# Patient Record
Sex: FEMALE | Race: WHITE | NOT HISPANIC OR LATINO | Employment: PART TIME | URBAN - METROPOLITAN AREA
[De-identification: names, ages, dates, MRNs, and addresses within clinical notes are randomized per-mention and may not be internally consistent; named-entity substitution may affect disease eponyms.]

---

## 2023-02-23 LAB
ALANINE AMINOTRANSFERASE (SGPT) (U/L) IN SER/PLAS: 37 U/L (ref 7–45)
ALBUMIN (G/DL) IN SER/PLAS: 4.4 G/DL (ref 3.4–5)
ALKALINE PHOSPHATASE (U/L) IN SER/PLAS: 72 U/L (ref 33–136)
ANION GAP IN SER/PLAS: 10 MMOL/L (ref 10–20)
ASPARTATE AMINOTRANSFERASE (SGOT) (U/L) IN SER/PLAS: 27 U/L (ref 9–39)
BASOPHILS (10*3/UL) IN BLOOD BY AUTOMATED COUNT: 0.06 X10E9/L (ref 0–0.1)
BASOPHILS/100 LEUKOCYTES IN BLOOD BY AUTOMATED COUNT: 1.2 % (ref 0–2)
BILIRUBIN TOTAL (MG/DL) IN SER/PLAS: 0.7 MG/DL (ref 0–1.2)
CALCIDIOL (25 OH VITAMIN D3) (NG/ML) IN SER/PLAS: 23 NG/ML
CALCIUM (MG/DL) IN SER/PLAS: 9.2 MG/DL (ref 8.6–10.6)
CARBON DIOXIDE, TOTAL (MMOL/L) IN SER/PLAS: 27 MMOL/L (ref 21–32)
CHLORIDE (MMOL/L) IN SER/PLAS: 108 MMOL/L (ref 98–107)
CHOLESTEROL (MG/DL) IN SER/PLAS: 171 MG/DL (ref 0–199)
CHOLESTEROL IN HDL (MG/DL) IN SER/PLAS: 80.7 MG/DL
CHOLESTEROL/HDL RATIO: 2.1
CREATININE (MG/DL) IN SER/PLAS: 0.9 MG/DL (ref 0.5–1.05)
EOSINOPHILS (10*3/UL) IN BLOOD BY AUTOMATED COUNT: 0.24 X10E9/L (ref 0–0.7)
EOSINOPHILS/100 LEUKOCYTES IN BLOOD BY AUTOMATED COUNT: 4.9 % (ref 0–6)
ERYTHROCYTE DISTRIBUTION WIDTH (RATIO) BY AUTOMATED COUNT: 13.1 % (ref 11.5–14.5)
ERYTHROCYTE MEAN CORPUSCULAR HEMOGLOBIN CONCENTRATION (G/DL) BY AUTOMATED: 32.1 G/DL (ref 32–36)
ERYTHROCYTE MEAN CORPUSCULAR VOLUME (FL) BY AUTOMATED COUNT: 96 FL (ref 80–100)
ERYTHROCYTES (10*6/UL) IN BLOOD BY AUTOMATED COUNT: 4.35 X10E12/L (ref 4–5.2)
ESTIMATED AVERAGE GLUCOSE FOR HBA1C: 114 MG/DL
GFR FEMALE: 70 ML/MIN/1.73M2
GLUCOSE (MG/DL) IN SER/PLAS: 90 MG/DL (ref 74–99)
HEMATOCRIT (%) IN BLOOD BY AUTOMATED COUNT: 41.7 % (ref 36–46)
HEMOGLOBIN (G/DL) IN BLOOD: 13.4 G/DL (ref 12–16)
HEMOGLOBIN A1C/HEMOGLOBIN TOTAL IN BLOOD: 5.6 %
IMMATURE GRANULOCYTES/100 LEUKOCYTES IN BLOOD BY AUTOMATED COUNT: 0.2 % (ref 0–0.9)
LDL: 60 MG/DL (ref 0–99)
LEUKOCYTES (10*3/UL) IN BLOOD BY AUTOMATED COUNT: 4.9 X10E9/L (ref 4.4–11.3)
LYMPHOCYTES (10*3/UL) IN BLOOD BY AUTOMATED COUNT: 1.99 X10E9/L (ref 1.2–4.8)
LYMPHOCYTES/100 LEUKOCYTES IN BLOOD BY AUTOMATED COUNT: 40.9 % (ref 13–44)
MONOCYTES (10*3/UL) IN BLOOD BY AUTOMATED COUNT: 0.33 X10E9/L (ref 0.1–1)
MONOCYTES/100 LEUKOCYTES IN BLOOD BY AUTOMATED COUNT: 6.8 % (ref 2–10)
NEUTROPHILS (10*3/UL) IN BLOOD BY AUTOMATED COUNT: 2.24 X10E9/L (ref 1.2–7.7)
NEUTROPHILS/100 LEUKOCYTES IN BLOOD BY AUTOMATED COUNT: 46 % (ref 40–80)
NRBC (PER 100 WBCS) BY AUTOMATED COUNT: 0 /100 WBC (ref 0–0)
PLATELETS (10*3/UL) IN BLOOD AUTOMATED COUNT: 228 X10E9/L (ref 150–450)
POTASSIUM (MMOL/L) IN SER/PLAS: 4.4 MMOL/L (ref 3.5–5.3)
PROTEIN TOTAL: 6.6 G/DL (ref 6.4–8.2)
SODIUM (MMOL/L) IN SER/PLAS: 141 MMOL/L (ref 136–145)
THYROTROPIN (MIU/L) IN SER/PLAS BY DETECTION LIMIT <= 0.05 MIU/L: 1.74 MIU/L (ref 0.44–3.98)
TRIGLYCERIDE (MG/DL) IN SER/PLAS: 151 MG/DL (ref 0–149)
UREA NITROGEN (MG/DL) IN SER/PLAS: 20 MG/DL (ref 6–23)
VLDL: 30 MG/DL (ref 0–40)

## 2023-03-14 LAB
ALANINE AMINOTRANSFERASE (SGPT) (U/L) IN SER/PLAS: 31 U/L (ref 7–45)
ASPARTATE AMINOTRANSFERASE (SGOT) (U/L) IN SER/PLAS: 25 U/L (ref 9–39)
CHOLESTEROL (MG/DL) IN SER/PLAS: 174 MG/DL (ref 0–199)
CHOLESTEROL IN HDL (MG/DL) IN SER/PLAS: 82.8 MG/DL
CHOLESTEROL/HDL RATIO: 2.1
LDL: 62 MG/DL (ref 0–99)
TRIGLYCERIDE (MG/DL) IN SER/PLAS: 145 MG/DL (ref 0–149)
VLDL: 29 MG/DL (ref 0–40)

## 2023-05-22 ENCOUNTER — DOCUMENTATION (OUTPATIENT)
Dept: PRIMARY CARE | Facility: CLINIC | Age: 68
End: 2023-05-22
Payer: MEDICARE

## 2023-05-23 ENCOUNTER — PATIENT OUTREACH (OUTPATIENT)
Dept: PRIMARY CARE | Facility: CLINIC | Age: 68
End: 2023-05-23
Payer: MEDICARE

## 2023-05-23 DIAGNOSIS — G45.4 TRANSIENT GLOBAL AMNESIA: ICD-10-CM

## 2023-05-23 NOTE — PROGRESS NOTES
Discharge Facility:[facility]  Discharge Diagnosis:Transient global Amnesia  Admission Date:5/19/23  Discharge Date: 5/20/23    PCP Appointment Date:6/5/23  Specialist Appointment Date:   Hospital Encounter and Summary: Linked   See discharge assessment below for further details  Engagement  Call Start Time: 0956 (5/23/2023 10:05 AM)    Medications  Medications reviewed with patient/caregiver?: Yes (5/23/2023 10:05 AM)  Is the patient having any side effects they believe may be caused by any medication additions or changes?: No (5/23/2023 10:05 AM)  Does the patient have all medications ordered at discharge?: Yes (5/23/2023 10:05 AM)  Care Management Interventions: Provided patient education (5/23/2023 10:05 AM)  Prescription Comments: see med list (5/23/2023 10:05 AM)  Is the patient taking all medications as directed (includes completed medication regime)?: Yes (5/23/2023 10:05 AM)  Care Management Interventions: Provided patient education (5/23/2023 10:05 AM)  Medication Comments: see med list (5/23/2023 10:05 AM)    Appointments  Does the patient have a primary care provider?: Yes (5/23/2023 10:05 AM)  Care Management Interventions: Verified appointment date/time/provider; Educated patient on importance of making appointment; Advised patient to make appointment (5/23/2023 10:05 AM)  Has the patient kept scheduled appointments due by today?: Yes (5/23/2023 10:05 AM)  Care Management Interventions: Advised patient to keep appointment; Educated on importance of keeping appointment (5/23/2023 10:05 AM)    Patient Teaching  Does the patient have access to their discharge instructions?: Yes (5/23/2023 10:05 AM)  Care Management Interventions: Reviewed instructions with patient (5/23/2023 10:05 AM)  What is the patient's perception of their health status since discharge?: Improving (Patient reported feeling a whole lot better, she stated she planted her garden in the backyard.) (5/23/2023 10:05 AM)  Is the patient/caregiver  able to teach back the hierarchy of who to call/visit for symptoms/problems? PCP, Specialist, Home Health nurse, Urgent Care, ED, 911: Yes (5/23/2023 10:05 AM)

## 2023-06-05 ENCOUNTER — APPOINTMENT (OUTPATIENT)
Dept: PRIMARY CARE | Facility: CLINIC | Age: 68
End: 2023-06-05
Payer: MEDICARE

## 2023-06-22 NOTE — PROGRESS NOTES
Unable to reach patient for call back after patient's follow up appointment with PCP.  LVM  with call back number for patient to call if needed  Patient never followed up with PCP

## 2023-07-12 ENCOUNTER — OFFICE VISIT (OUTPATIENT)
Dept: PRIMARY CARE | Facility: CLINIC | Age: 68
End: 2023-07-12
Payer: MEDICARE

## 2023-07-12 VITALS
BODY MASS INDEX: 27.12 KG/M2 | DIASTOLIC BLOOD PRESSURE: 81 MMHG | SYSTOLIC BLOOD PRESSURE: 123 MMHG | HEART RATE: 66 BPM | OXYGEN SATURATION: 95 % | WEIGHT: 168 LBS

## 2023-07-12 DIAGNOSIS — I10 BENIGN ESSENTIAL HYPERTENSION: ICD-10-CM

## 2023-07-12 DIAGNOSIS — H53.8 BLURRY VISION: ICD-10-CM

## 2023-07-12 DIAGNOSIS — M47.812 CERVICAL SPONDYLOSIS WITHOUT MYELOPATHY: Primary | ICD-10-CM

## 2023-07-12 DIAGNOSIS — I25.10 CORONARY ARTERY DISEASE INVOLVING NATIVE HEART, UNSPECIFIED VESSEL OR LESION TYPE, UNSPECIFIED WHETHER ANGINA PRESENT: ICD-10-CM

## 2023-07-12 DIAGNOSIS — K21.9 GASTROESOPHAGEAL REFLUX DISEASE, UNSPECIFIED WHETHER ESOPHAGITIS PRESENT: ICD-10-CM

## 2023-07-12 PROBLEM — M54.2 NECK PAIN: Status: ACTIVE | Noted: 2023-07-12

## 2023-07-12 PROBLEM — M48.02 NEUROFORAMINAL STENOSIS OF CERVICAL SPINE: Status: ACTIVE | Noted: 2023-07-12

## 2023-07-12 PROBLEM — G45.4 AMNESIA, GLOBAL, TRANSIENT: Status: ACTIVE | Noted: 2023-07-12

## 2023-07-12 PROBLEM — R79.9 ABNORMAL BLOOD CHEMISTRY: Status: ACTIVE | Noted: 2023-07-12

## 2023-07-12 PROBLEM — M54.50 LOW BACK PAIN: Status: ACTIVE | Noted: 2023-07-12

## 2023-07-12 PROBLEM — M47.816 LUMBAR SPONDYLOSIS: Status: ACTIVE | Noted: 2023-07-12

## 2023-07-12 PROBLEM — R41.3 AMNESIA: Status: ACTIVE | Noted: 2023-07-12

## 2023-07-12 PROBLEM — K57.90 DIVERTICULOSIS: Status: ACTIVE | Noted: 2023-07-12

## 2023-07-12 PROBLEM — F41.9 ANXIETY: Status: ACTIVE | Noted: 2023-07-12

## 2023-07-12 PROBLEM — M47.819 FACET JOINT DISEASE: Status: ACTIVE | Noted: 2023-07-12

## 2023-07-12 PROBLEM — Z20.822 CLOSE EXPOSURE TO COVID-19 VIRUS: Status: ACTIVE | Noted: 2023-07-12

## 2023-07-12 PROBLEM — J30.9 ALLERGIC RHINITIS: Status: ACTIVE | Noted: 2023-07-12

## 2023-07-12 PROBLEM — M25.50 JOINT PAIN: Status: ACTIVE | Noted: 2023-07-12

## 2023-07-12 PROBLEM — R00.2 PALPITATION: Status: ACTIVE | Noted: 2023-07-12

## 2023-07-12 PROBLEM — L98.9 SKIN LESION: Status: ACTIVE | Noted: 2023-07-12

## 2023-07-12 PROBLEM — M19.049 HAND ARTHRITIS: Status: ACTIVE | Noted: 2023-07-12

## 2023-07-12 PROBLEM — E55.9 VITAMIN D DEFICIENCY: Status: ACTIVE | Noted: 2023-07-12

## 2023-07-12 PROBLEM — E78.5 HYPERLIPIDEMIA: Status: ACTIVE | Noted: 2023-07-12

## 2023-07-12 PROBLEM — M79.18 MYOFASCIAL PAIN: Status: ACTIVE | Noted: 2023-07-12

## 2023-07-12 PROBLEM — M48.061 SPINAL STENOSIS AT L4-L5 LEVEL: Status: ACTIVE | Noted: 2023-07-12

## 2023-07-12 PROBLEM — R93.89 ABNORMAL CAT SCAN: Status: ACTIVE | Noted: 2023-07-12

## 2023-07-12 PROBLEM — M48.062 SPINAL STENOSIS OF LUMBAR REGION WITH NEUROGENIC CLAUDICATION: Status: ACTIVE | Noted: 2023-07-12

## 2023-07-12 PROBLEM — R73.03 PREDIABETES: Status: ACTIVE | Noted: 2023-07-12

## 2023-07-12 PROCEDURE — 1159F MED LIST DOCD IN RCRD: CPT | Performed by: PHYSICIAN ASSISTANT

## 2023-07-12 PROCEDURE — 3074F SYST BP LT 130 MM HG: CPT | Performed by: PHYSICIAN ASSISTANT

## 2023-07-12 PROCEDURE — 3079F DIAST BP 80-89 MM HG: CPT | Performed by: PHYSICIAN ASSISTANT

## 2023-07-12 PROCEDURE — 1160F RVW MEDS BY RX/DR IN RCRD: CPT | Performed by: PHYSICIAN ASSISTANT

## 2023-07-12 PROCEDURE — 99214 OFFICE O/P EST MOD 30 MIN: CPT | Performed by: PHYSICIAN ASSISTANT

## 2023-07-12 PROCEDURE — 1036F TOBACCO NON-USER: CPT | Performed by: PHYSICIAN ASSISTANT

## 2023-07-12 PROCEDURE — 1126F AMNT PAIN NOTED NONE PRSNT: CPT | Performed by: PHYSICIAN ASSISTANT

## 2023-07-12 RX ORDER — METOPROLOL TARTRATE 50 MG/1
1 TABLET ORAL 2 TIMES DAILY
COMMUNITY
Start: 2017-11-20

## 2023-07-12 RX ORDER — EVOLOCUMAB 140 MG/ML
INJECTION, SOLUTION SUBCUTANEOUS
COMMUNITY
Start: 2017-02-07 | End: 2024-02-05 | Stop reason: SDUPTHER

## 2023-07-12 RX ORDER — FLAXSEED OIL 1000 MG
1 CAPSULE ORAL DAILY
Qty: 30 CAPSULE | Refills: 5 | Status: SHIPPED | OUTPATIENT
Start: 2023-07-12

## 2023-07-12 RX ORDER — FAMOTIDINE 20 MG/1
20 TABLET, FILM COATED ORAL DAILY
Qty: 30 TABLET | Refills: 5 | Status: SHIPPED | OUTPATIENT
Start: 2023-07-12 | End: 2024-02-13 | Stop reason: SDUPTHER

## 2023-07-12 RX ORDER — ERGOCALCIFEROL 1.25 MG/1
1 CAPSULE ORAL WEEKLY
COMMUNITY
Start: 2023-02-28 | End: 2023-08-28 | Stop reason: SDUPTHER

## 2023-07-12 RX ORDER — ATORVASTATIN CALCIUM 20 MG/1
20 TABLET, FILM COATED ORAL
COMMUNITY

## 2023-07-12 RX ORDER — TIZANIDINE 2 MG/1
2 TABLET ORAL NIGHTLY PRN
Qty: 30 TABLET | Refills: 0 | Status: CANCELLED | OUTPATIENT
Start: 2023-07-12 | End: 2023-08-11

## 2023-07-12 RX ORDER — FAMOTIDINE 10 MG/1
10 TABLET ORAL 2 TIMES DAILY
COMMUNITY
End: 2023-07-12 | Stop reason: ALTCHOICE

## 2023-07-12 RX ORDER — CYCLOBENZAPRINE HCL 5 MG
5 TABLET ORAL NIGHTLY PRN
Qty: 30 TABLET | Refills: 0 | Status: SHIPPED | OUTPATIENT
Start: 2023-07-12 | End: 2023-08-11

## 2023-07-12 RX ORDER — ASPIRIN 81 MG/1
1 TABLET ORAL DAILY
COMMUNITY
Start: 2015-11-23

## 2023-07-12 RX ORDER — CLOPIDOGREL BISULFATE 75 MG/1
75 TABLET ORAL
COMMUNITY

## 2023-07-12 NOTE — PROGRESS NOTES
Subjective   Isabella Dee is a 68 y.o. female who presents for follow up, lost brain.  HPI Isabella Dee is a 68 y.o. female presenting for a follow up. Generally doing well. Currently c/o:    Hospitalization: had an episode of transient global amnesia. No cause was ever determined. She cannot recall her entire hospitalization.     Cervical spondylosis: chronic, manages the pain with tylenol. She is on Plavix and cannot take NSAIDs. States some nights it is hard to fall asleep d/t the pain and that her neck and shoulder muscles feel tight.     HTN, HLD, CAD: stable on current regimen. Denies HA, dizziness, CP, SOB/ALTMAN, LE edema.     PMH: allergic rhinitis, HLD, CAD, HTN, esophageal reflux, diverticulosis, lumbar spondylosis and spinal stenosis, prediabetes, postmenopausal state     Health maintenance:  Immunizations:  -Flu: UTD   -Shingrix: UTD   -Pneumococcal: UTD  Fortino w/ sherri UTD (last 12/5/22)   Colon CA screening UTD (last 2/24/23)  DEXA screening UTD (last 6/2/8/21)     Last MCR/CPE: unknown (MCR ADV)    12 point ROS reviewed and negative other than as stated in HPI    /81   Pulse 66   Wt 76.2 kg (168 lb)   SpO2 95%   BMI 27.12 kg/m²   Objective   Physical Exam  Vitals reviewed.   Constitutional:       General: She is not in acute distress.     Appearance: Normal appearance. She is not ill-appearing.   HENT:      Head: Normocephalic and atraumatic.   Eyes:      General: No scleral icterus.     Extraocular Movements: Extraocular movements intact.      Conjunctiva/sclera: Conjunctivae normal.      Pupils: Pupils are equal, round, and reactive to light.   Cardiovascular:      Rate and Rhythm: Normal rate and regular rhythm.      Heart sounds: Normal heart sounds. No murmur heard.     No friction rub. No gallop.   Pulmonary:      Effort: Pulmonary effort is normal. No respiratory distress.      Breath sounds: Normal breath sounds. No stridor. No wheezing, rhonchi or rales.   Musculoskeletal:      Cervical  back: Normal range of motion.      Right lower leg: No edema.      Left lower leg: No edema.   Skin:     General: Skin is warm and dry.   Neurological:      Mental Status: She is alert and oriented to person, place, and time. Mental status is at baseline.      Cranial Nerves: No cranial nerve deficit.      Gait: Gait normal.   Psychiatric:         Mood and Affect: Mood normal.         Behavior: Behavior normal.         Assessment/Plan   Problem List Items Addressed This Visit       Benign essential hypertension     Well controlled. Continue metoprolol tartrate 50mg. Follow a strict DASH diet and exercise as tolerated         CAD (coronary artery disease)     Continue plavix, repatha, 81mg ASA, atorvastatin 20mg. Follow a mediterranean style diet and exercise as tolerated.          Relevant Medications    clopidogrel (Plavix) 75 mg tablet    metoprolol tartrate (Lopressor) 50 mg tablet    Cervical spondylosis without myelopathy - Primary     Trial cyclobenzaprine at bedtime only. Discussed strict precautions with use. Use topical lidocaine patches in 12 hour intervals. Can refer to PT and/or pain management if persistent         Relevant Medications    cyclobenzaprine (Flexeril) 5 mg tablet    Other Relevant Orders    Referral to [facility]    Gastroesophageal reflux disease     Prescription for famotidine 20mg provided. Avoid triggering foods.          Relevant Medications    famotidine (Pepcid) 20 mg tablet    flaxseed oiL 1,000 mg capsule    Blurry vision     Referral to ophthalmology placed         Relevant Orders    Referral to Ophthalmology        Follow up in 4 months or sooner as needed

## 2023-07-17 PROBLEM — H53.8 BLURRY VISION: Status: ACTIVE | Noted: 2023-07-17

## 2023-07-17 PROBLEM — K21.9 GASTROESOPHAGEAL REFLUX DISEASE: Status: ACTIVE | Noted: 2023-07-17

## 2023-07-17 PROBLEM — Z20.822 CLOSE EXPOSURE TO COVID-19 VIRUS: Status: RESOLVED | Noted: 2023-07-12 | Resolved: 2023-07-17

## 2023-07-17 NOTE — ASSESSMENT & PLAN NOTE
Well controlled. Continue metoprolol tartrate 50mg. Follow a strict DASH diet and exercise as tolerated

## 2023-07-17 NOTE — ASSESSMENT & PLAN NOTE
Continue plavix, repatha, 81mg ASA, atorvastatin 20mg. Follow a mediterranean style diet and exercise as tolerated.

## 2023-07-17 NOTE — ASSESSMENT & PLAN NOTE
Trial cyclobenzaprine at bedtime only. Discussed strict precautions with use. Use topical lidocaine patches in 12 hour intervals. Can refer to PT and/or pain management if persistent

## 2023-08-27 PROBLEM — I82.90 VENOUS THROMBOSIS: Status: ACTIVE | Noted: 2023-08-27

## 2023-08-27 PROBLEM — R07.9 CHEST PAIN: Status: ACTIVE | Noted: 2023-08-27

## 2023-08-28 DIAGNOSIS — E55.9 VITAMIN D DEFICIENCY: Primary | ICD-10-CM

## 2023-08-28 RX ORDER — ERGOCALCIFEROL 1.25 MG/1
1 CAPSULE ORAL
Qty: 4 CAPSULE | Refills: 0 | Status: SHIPPED | OUTPATIENT
Start: 2023-08-28 | End: 2023-09-27

## 2023-08-29 RX ORDER — DORZOLAMIDE HYDROCHLORIDE AND TIMOLOL MALEATE 20; 5 MG/ML; MG/ML
SOLUTION/ DROPS OPHTHALMIC
COMMUNITY
Start: 2023-07-19 | End: 2024-02-05 | Stop reason: ALTCHOICE

## 2023-08-29 RX ORDER — LATANOPROST/PF 0.005 %
DROPS OPHTHALMIC (EYE)
COMMUNITY
Start: 2023-03-07 | End: 2024-02-05 | Stop reason: ALTCHOICE

## 2023-08-29 RX ORDER — TIMOLOL MALEATE 5 MG/ML
SOLUTION/ DROPS OPHTHALMIC
COMMUNITY
Start: 2023-04-12

## 2023-10-04 ENCOUNTER — APPOINTMENT (OUTPATIENT)
Dept: DERMATOLOGY | Facility: CLINIC | Age: 68
End: 2023-10-04
Payer: MEDICARE

## 2023-10-09 ENCOUNTER — TELEPHONE (OUTPATIENT)
Dept: PRIMARY CARE | Facility: CLINIC | Age: 68
End: 2023-10-09
Payer: MEDICARE

## 2023-10-09 DIAGNOSIS — E55.9 VITAMIN D DEFICIENCY: Primary | ICD-10-CM

## 2023-10-09 RX ORDER — ERGOCALCIFEROL 1.25 MG/1
1.25 CAPSULE ORAL
Qty: 12 CAPSULE | Refills: 1 | Status: SHIPPED | OUTPATIENT
Start: 2023-10-09 | End: 2024-02-13 | Stop reason: SDUPTHER

## 2023-10-11 ENCOUNTER — SPECIALTY PHARMACY (OUTPATIENT)
Dept: PHARMACY | Facility: CLINIC | Age: 68
End: 2023-10-11

## 2023-10-11 ENCOUNTER — PHARMACY VISIT (OUTPATIENT)
Dept: PHARMACY | Facility: CLINIC | Age: 68
End: 2023-10-11
Payer: COMMERCIAL

## 2023-10-11 PROCEDURE — RXMED WILLOW AMBULATORY MEDICATION CHARGE

## 2023-10-13 NOTE — PROGRESS NOTES
[facility] PATIENT REASSESSMENT NOTE  This is continuation of therapy.  [facility] SUPPLIED MEDICATION:  Repatha  Completed call with patient's   The patient's care will be continued with the referring prescriber.     TOLERANCE:   Have you experienced any side effects from this medication? No    Are there any changes to current therapy regimen? No    EFFICACY:    Have you developed any new symptoms of your condition? No    How do you feel your medication is affecting your disease state? Med has greatly improved her cholesterol    GOALS:  Your goals of therapy are: Maintain LDL at goal    COMPLIANCE / ADHERENCE:  Have you had any unplanned missed doses? No- sometimes a little late taking dose by a day or so but stays on track with every 2 week dosing  If yes, how often do you miss doses and is there a particular contributing reason?     What barriers to adherence does the patient have?   Patient has a difficult time remembering to take medications? No  Difficult administration technique? No  Medication cost? No  Patient does not think medication is beneficial? No  Other?   What actions were taken to address barriers? NA    Does the patient have any barriers to self-administration (including physical and mental)? No  List barriers: NA  Describe actions taken to mitigate barriers: NA    GENERAL ASSESSMENT:  Are there any changes to your home medications, OTCs or supplements? No    Do you have any new allergies? No     Have you been diagnosed with any new medical conditions? No    PATIENT MANAGEMENT:    Do you have any questions regarding your medications, or care? No    Do you have any concerns with access to your medications? No    How would you classify your Quality of Life on a scale of 1-10? Not able to assess    How would you describe your satisfaction with  Specialty Pharmacy services on a scale of 1-10? Not assessed    Do you have any additional suggestions to improve  Specialty  Pharmacy services? No    IMPRESSION/PLAN:  Is patient high risk (potential patients:  pregnancy, geriatric, pediatric)?  Geriatric  Is laboratory follow-up needed? No  Is a clinical intervention needed? No  Next reassessment date? Approx. 1/15/24  Additional comments:

## 2023-11-02 ENCOUNTER — SPECIALTY PHARMACY (OUTPATIENT)
Dept: PHARMACY | Facility: CLINIC | Age: 68
End: 2023-11-02

## 2023-11-03 ENCOUNTER — PHARMACY VISIT (OUTPATIENT)
Dept: PHARMACY | Facility: CLINIC | Age: 68
End: 2023-11-03
Payer: COMMERCIAL

## 2023-11-03 PROCEDURE — RXMED WILLOW AMBULATORY MEDICATION CHARGE

## 2023-11-13 ENCOUNTER — APPOINTMENT (OUTPATIENT)
Dept: PRIMARY CARE | Facility: CLINIC | Age: 68
End: 2023-11-13
Payer: MEDICARE

## 2023-11-14 ENCOUNTER — LAB (OUTPATIENT)
Dept: LAB | Facility: LAB | Age: 68
End: 2023-11-14
Payer: MEDICARE

## 2023-11-14 ENCOUNTER — OFFICE VISIT (OUTPATIENT)
Dept: PRIMARY CARE | Facility: CLINIC | Age: 68
End: 2023-11-14
Payer: MEDICARE

## 2023-11-14 VITALS
HEART RATE: 68 BPM | WEIGHT: 170 LBS | SYSTOLIC BLOOD PRESSURE: 134 MMHG | HEIGHT: 66 IN | BODY MASS INDEX: 27.32 KG/M2 | OXYGEN SATURATION: 96 % | DIASTOLIC BLOOD PRESSURE: 83 MMHG

## 2023-11-14 DIAGNOSIS — Z12.31 ENCOUNTER FOR SCREENING MAMMOGRAM FOR BREAST CANCER: ICD-10-CM

## 2023-11-14 DIAGNOSIS — I25.10 CORONARY ARTERY DISEASE INVOLVING NATIVE HEART, UNSPECIFIED VESSEL OR LESION TYPE, UNSPECIFIED WHETHER ANGINA PRESENT: ICD-10-CM

## 2023-11-14 DIAGNOSIS — E55.9 VITAMIN D DEFICIENCY: ICD-10-CM

## 2023-11-14 DIAGNOSIS — R79.9 ABNORMAL BLOOD CHEMISTRY: ICD-10-CM

## 2023-11-14 DIAGNOSIS — I10 BENIGN ESSENTIAL HYPERTENSION: ICD-10-CM

## 2023-11-14 DIAGNOSIS — H53.8 BLURRY VISION: ICD-10-CM

## 2023-11-14 DIAGNOSIS — I10 BENIGN ESSENTIAL HYPERTENSION: Primary | ICD-10-CM

## 2023-11-14 LAB
25(OH)D3 SERPL-MCNC: 50 NG/ML (ref 30–100)
ALBUMIN SERPL BCP-MCNC: 4.2 G/DL (ref 3.4–5)
ALP SERPL-CCNC: 70 U/L (ref 33–136)
ALT SERPL W P-5'-P-CCNC: 29 U/L (ref 7–45)
ANION GAP SERPL CALC-SCNC: 12 MMOL/L (ref 10–20)
AST SERPL W P-5'-P-CCNC: 24 U/L (ref 9–39)
BASOPHILS # BLD AUTO: 0.06 X10*3/UL (ref 0–0.1)
BASOPHILS NFR BLD AUTO: 1.1 %
BILIRUB SERPL-MCNC: 0.6 MG/DL (ref 0–1.2)
BUN SERPL-MCNC: 21 MG/DL (ref 6–23)
CALCIUM SERPL-MCNC: 9.5 MG/DL (ref 8.6–10.6)
CHLORIDE SERPL-SCNC: 106 MMOL/L (ref 98–107)
CHOLEST SERPL-MCNC: 158 MG/DL (ref 0–199)
CHOLESTEROL/HDL RATIO: 2.1
CO2 SERPL-SCNC: 28 MMOL/L (ref 21–32)
CREAT SERPL-MCNC: 0.95 MG/DL (ref 0.5–1.05)
EOSINOPHIL # BLD AUTO: 0.22 X10*3/UL (ref 0–0.7)
EOSINOPHIL NFR BLD AUTO: 4.2 %
ERYTHROCYTE [DISTWIDTH] IN BLOOD BY AUTOMATED COUNT: 12.6 % (ref 11.5–14.5)
EST. AVERAGE GLUCOSE BLD GHB EST-MCNC: 108 MG/DL
GFR SERPL CREATININE-BSD FRML MDRD: 66 ML/MIN/1.73M*2
GLUCOSE SERPL-MCNC: 84 MG/DL (ref 74–99)
HBA1C MFR BLD: 5.4 %
HCT VFR BLD AUTO: 44.8 % (ref 36–46)
HDLC SERPL-MCNC: 76 MG/DL
HGB BLD-MCNC: 14 G/DL (ref 12–16)
IMM GRANULOCYTES # BLD AUTO: 0.01 X10*3/UL (ref 0–0.7)
IMM GRANULOCYTES NFR BLD AUTO: 0.2 % (ref 0–0.9)
LDLC SERPL CALC-MCNC: 47 MG/DL
LYMPHOCYTES # BLD AUTO: 2.09 X10*3/UL (ref 1.2–4.8)
LYMPHOCYTES NFR BLD AUTO: 39.6 %
MCH RBC QN AUTO: 31.8 PG (ref 26–34)
MCHC RBC AUTO-ENTMCNC: 31.3 G/DL (ref 32–36)
MCV RBC AUTO: 102 FL (ref 80–100)
MONOCYTES # BLD AUTO: 0.48 X10*3/UL (ref 0.1–1)
MONOCYTES NFR BLD AUTO: 9.1 %
NEUTROPHILS # BLD AUTO: 2.42 X10*3/UL (ref 1.2–7.7)
NEUTROPHILS NFR BLD AUTO: 45.8 %
NON HDL CHOLESTEROL: 82 MG/DL (ref 0–149)
NRBC BLD-RTO: 0 /100 WBCS (ref 0–0)
PLATELET # BLD AUTO: 237 X10*3/UL (ref 150–450)
POTASSIUM SERPL-SCNC: 4.8 MMOL/L (ref 3.5–5.3)
PROT SERPL-MCNC: 6.5 G/DL (ref 6.4–8.2)
RBC # BLD AUTO: 4.4 X10*6/UL (ref 4–5.2)
SODIUM SERPL-SCNC: 141 MMOL/L (ref 136–145)
TRIGL SERPL-MCNC: 174 MG/DL (ref 0–149)
VLDL: 35 MG/DL (ref 0–40)
WBC # BLD AUTO: 5.3 X10*3/UL (ref 4.4–11.3)

## 2023-11-14 PROCEDURE — 1160F RVW MEDS BY RX/DR IN RCRD: CPT | Performed by: PHYSICIAN ASSISTANT

## 2023-11-14 PROCEDURE — 80061 LIPID PANEL: CPT

## 2023-11-14 PROCEDURE — 3075F SYST BP GE 130 - 139MM HG: CPT | Performed by: PHYSICIAN ASSISTANT

## 2023-11-14 PROCEDURE — 80053 COMPREHEN METABOLIC PANEL: CPT

## 2023-11-14 PROCEDURE — 1036F TOBACCO NON-USER: CPT | Performed by: PHYSICIAN ASSISTANT

## 2023-11-14 PROCEDURE — 83036 HEMOGLOBIN GLYCOSYLATED A1C: CPT

## 2023-11-14 PROCEDURE — 99213 OFFICE O/P EST LOW 20 MIN: CPT | Performed by: PHYSICIAN ASSISTANT

## 2023-11-14 PROCEDURE — 1126F AMNT PAIN NOTED NONE PRSNT: CPT | Performed by: PHYSICIAN ASSISTANT

## 2023-11-14 PROCEDURE — 3079F DIAST BP 80-89 MM HG: CPT | Performed by: PHYSICIAN ASSISTANT

## 2023-11-14 PROCEDURE — 36415 COLL VENOUS BLD VENIPUNCTURE: CPT

## 2023-11-14 PROCEDURE — 1159F MED LIST DOCD IN RCRD: CPT | Performed by: PHYSICIAN ASSISTANT

## 2023-11-14 PROCEDURE — 85025 COMPLETE CBC W/AUTO DIFF WBC: CPT

## 2023-11-14 PROCEDURE — 82306 VITAMIN D 25 HYDROXY: CPT

## 2023-11-14 ASSESSMENT — COLUMBIA-SUICIDE SEVERITY RATING SCALE - C-SSRS
6. HAVE YOU EVER DONE ANYTHING, STARTED TO DO ANYTHING, OR PREPARED TO DO ANYTHING TO END YOUR LIFE?: NO
1. IN THE PAST MONTH, HAVE YOU WISHED YOU WERE DEAD OR WISHED YOU COULD GO TO SLEEP AND NOT WAKE UP?: NO
2. HAVE YOU ACTUALLY HAD ANY THOUGHTS OF KILLING YOURSELF?: NO

## 2023-11-14 ASSESSMENT — PATIENT HEALTH QUESTIONNAIRE - PHQ9
1. LITTLE INTEREST OR PLEASURE IN DOING THINGS: NOT AT ALL
SUM OF ALL RESPONSES TO PHQ9 QUESTIONS 1 AND 2: 0
2. FEELING DOWN, DEPRESSED OR HOPELESS: NOT AT ALL

## 2023-11-14 ASSESSMENT — ENCOUNTER SYMPTOMS
LOSS OF SENSATION IN FEET: 0
DEPRESSION: 0
OCCASIONAL FEELINGS OF UNSTEADINESS: 0

## 2023-11-14 NOTE — ASSESSMENT & PLAN NOTE
Continue clopidogrel 75mg, repatha, 81mg ASA, atorvastatin 20mg. Follow a mediterranean style diet and exercise as tolerated. Lipid panel ordered

## 2023-11-14 NOTE — PROGRESS NOTES
"Subjective   Isabella Dee is a 68 y.o. female who presents for Follow-up.  Generally doing well. Currently c/o:    In need of ophthalmology referral for 2nd opinion regarding blurry vision     HTN, HLD, CAD: taking metoprolol tartrate, 81mg ASA, repatha, and clopidogrel 75mg. Denies HA, dizziness, CP, SOB/ALTMAN, LE edema. Follows with cardiology    Cervical spondylosis: chronic, manages the pain with tylenol. She is on Plavix and cannot take NSAIDs. States some nights it is hard to fall asleep d/t the pain and that her neck and shoulder muscles feel tight. She is interested     PMH: allergic rhinitis, HLD, CAD, HTN, esophageal reflux, diverticulosis, lumbar spondylosis and spinal stenosis, prediabetes, postmenopausal state     Health maintenance:  Immunizations:  -Flu: UTD, 9/2023   -Shingrix: UTD   -Pneumococcal: UTD  -RSV: UTD, 1 mo ago   -Tdap: recommended from pharmacy   Mammogram UTD (last 12/5/22)   Colon CA screening UTD (last 2/24/23) - 5yrs  DEXA screening UTD (last 6/2/8/21)   PAP UTD (last 8/2022) - per [facility]     Last MCR/CPE: 2/23/23 (MCR ADV)    12 point ROS reviewed and negative other than as stated in HPI    /83   Pulse 68   Ht 1.676 m (5' 6\")   Wt 77.1 kg (170 lb)   SpO2 96%   BMI 27.44 kg/m²   Objective   Physical Exam  Vitals reviewed.   Constitutional:       General: She is not in acute distress.     Appearance: Normal appearance. She is not ill-appearing.   HENT:      Head: Normocephalic and atraumatic.   Eyes:      General: No scleral icterus.     Extraocular Movements: Extraocular movements intact.      Conjunctiva/sclera: Conjunctivae normal.      Pupils: Pupils are equal, round, and reactive to light.   Cardiovascular:      Rate and Rhythm: Normal rate and regular rhythm.      Heart sounds: Normal heart sounds. No murmur heard.     No friction rub. No gallop.   Pulmonary:      Effort: Pulmonary effort is normal. No respiratory distress.      Breath sounds: Normal breath sounds. No " stridor. No wheezing, rhonchi or rales.   Musculoskeletal:      Cervical back: Normal range of motion.      Right lower leg: No edema.      Left lower leg: No edema.   Skin:     General: Skin is warm and dry.   Neurological:      Mental Status: She is alert and oriented to person, place, and time. Mental status is at baseline.      Cranial Nerves: No cranial nerve deficit.      Gait: Gait normal.   Psychiatric:         Mood and Affect: Mood normal.         Behavior: Behavior normal.         Assessment/Plan   Problem List Items Addressed This Visit       Abnormal blood chemistry     Hemoglobin A1c ordered         Relevant Orders    Hemoglobin A1c    Benign essential hypertension - Primary     Well controlled. Continue metoprolol tartrate 50mg. Follow a strict DASH diet and exercise as tolerated         Relevant Orders    Comprehensive metabolic panel    CBC and Auto Differential    CAD (coronary artery disease)     Continue clopidogrel 75mg, repatha, 81mg ASA, atorvastatin 20mg. Follow a mediterranean style diet and exercise as tolerated. Lipid panel ordered          Relevant Orders    Lipid panel    Vitamin D deficiency     Vitamin D level ordered          Relevant Orders    Vitamin D 25-Hydroxy,Total (for eval of Vitamin D levels)    Blurry vision     Referral to ophthalmology placed         Relevant Orders    Referral to Ophthalmology    Encounter for screening mammogram for breast cancer     Mammogram ordered          Relevant Orders    BI mammo bilateral screening tomosynthesis      Follow up in 4 months or sooner as needed

## 2023-11-15 ENCOUNTER — OFFICE VISIT (OUTPATIENT)
Dept: DERMATOLOGY | Facility: CLINIC | Age: 68
End: 2023-11-15
Payer: MEDICARE

## 2023-11-15 VITALS — HEART RATE: 65 BPM | DIASTOLIC BLOOD PRESSURE: 83 MMHG | SYSTOLIC BLOOD PRESSURE: 126 MMHG

## 2023-11-15 DIAGNOSIS — L57.8 SUN-DAMAGED SKIN: ICD-10-CM

## 2023-11-15 DIAGNOSIS — D18.01 CHERRY ANGIOMA: ICD-10-CM

## 2023-11-15 DIAGNOSIS — L82.1 SEBORRHEIC KERATOSES: ICD-10-CM

## 2023-11-15 DIAGNOSIS — L91.8 SKIN TAG: Primary | ICD-10-CM

## 2023-11-15 NOTE — PROGRESS NOTES
Subjective     Isabella Dee is a 68 y.o. female who presents for the following: Skin Check.     Patient is primarily concerned about small lesions around neck and underneath breasts. She states they become itchy and irritated at times.  She denies a personal history of skin cancer.    Review of Systems:  No other skin or systemic complaints other than what is documented elsewhere in the note.    The following portions of the chart were reviewed this encounter and updated as appropriate:   Tobacco  Allergies  Meds  Problems  Med Hx  Surg Hx         Skin Cancer History  No skin cancer on file.      Specialty Problems          Dermatology Problems    Skin lesion        Objective   Well appearing patient in no apparent distress; mood and affect are within normal limits.    A focused skin examination was performed. All findings within normal limits unless otherwise noted below.    Assessment/Plan   1. Skin tag (3)  Neck - Anterior (3)  Fleshy pedunculated papule    Provided reassurance about benign nature of lesion. No treatment is needed.    2. Sun-damaged skin  - scattered tan macules, telangiectasias, and general photo-damage    Actinically damaged skin-  - Sun protective behavior reviewed and encouraged including the use of over-the-counter sunscreen with SPF30+ daily (reapply every 1.5 hours when outdoors), UPF clothing, broad rimmed hats, sunglasses, and avoidance of midday sun. Home skin monitoring encouraged and how to monitor for skin cancer (changing or new moles, new rapidly growing or non-healing lesions) reviewed. Patient encouraged to call with interval concerns or changes.      3. Cherry angioma  - scattered small bright red papules and macules    Cherry angioma(s)  - Discussed benign nature and that no treatment is necessary unless it becomes painful or increases in size. Patient opts for clinical monitoring at this time.      4. Seborrheic keratoses  - Scattered waxy tan/grey/brown papules with  horn cysts, including in the inframammary folds    Seborrheic keratosis (-es)  - Discussed benign nature and that no treatment is necessary unless it becomes painful or increases in size. Patient opts for clinical monitoring at this time.        Follow up as needed

## 2023-11-22 NOTE — RESULT ENCOUNTER NOTE
Triglycerides were elevated. Cut back on red meats, butter, breads, fast food, and highly fatty foods.     Remainder of labs look stable. Continue a healthy lifestyle through diet and exercise.

## 2023-11-27 ENCOUNTER — SPECIALTY PHARMACY (OUTPATIENT)
Dept: PHARMACY | Facility: CLINIC | Age: 68
End: 2023-11-27

## 2023-11-27 ENCOUNTER — PHARMACY VISIT (OUTPATIENT)
Dept: PHARMACY | Facility: CLINIC | Age: 68
End: 2023-11-27
Payer: COMMERCIAL

## 2023-11-27 PROCEDURE — RXMED WILLOW AMBULATORY MEDICATION CHARGE

## 2023-12-20 ENCOUNTER — SPECIALTY PHARMACY (OUTPATIENT)
Dept: PHARMACY | Facility: CLINIC | Age: 68
End: 2023-12-20

## 2023-12-29 PROCEDURE — RXMED WILLOW AMBULATORY MEDICATION CHARGE

## 2024-01-05 ENCOUNTER — PHARMACY VISIT (OUTPATIENT)
Dept: PHARMACY | Facility: CLINIC | Age: 69
End: 2024-01-05
Payer: COMMERCIAL

## 2024-01-09 ENCOUNTER — SPECIALTY PHARMACY (OUTPATIENT)
Dept: PHARMACY | Facility: CLINIC | Age: 69
End: 2024-01-09

## 2024-01-25 PROCEDURE — RXMED WILLOW AMBULATORY MEDICATION CHARGE

## 2024-01-26 ENCOUNTER — PHARMACY VISIT (OUTPATIENT)
Dept: PHARMACY | Facility: CLINIC | Age: 69
End: 2024-01-26
Payer: COMMERCIAL

## 2024-01-29 ENCOUNTER — APPOINTMENT (OUTPATIENT)
Dept: CARDIOLOGY | Facility: CLINIC | Age: 69
End: 2024-01-29
Payer: MEDICARE

## 2024-02-05 ENCOUNTER — OFFICE VISIT (OUTPATIENT)
Dept: CARDIOLOGY | Facility: CLINIC | Age: 69
End: 2024-02-05
Payer: MEDICARE

## 2024-02-05 ENCOUNTER — HOSPITAL ENCOUNTER (OUTPATIENT)
Dept: CARDIOLOGY | Facility: CLINIC | Age: 69
Discharge: HOME | End: 2024-02-05
Payer: MEDICARE

## 2024-02-05 VITALS
SYSTOLIC BLOOD PRESSURE: 145 MMHG | OXYGEN SATURATION: 93 % | HEART RATE: 78 BPM | DIASTOLIC BLOOD PRESSURE: 85 MMHG | BODY MASS INDEX: 27.92 KG/M2 | HEIGHT: 66 IN | WEIGHT: 173.7 LBS

## 2024-02-05 DIAGNOSIS — I25.10 ATHEROSCLEROSIS OF NATIVE CORONARY ARTERY, UNSPECIFIED WHETHER ANGINA PRESENT, UNSPECIFIED WHETHER NATIVE OR TRANSPLANTED HEART: Primary | ICD-10-CM

## 2024-02-05 PROCEDURE — 99214 OFFICE O/P EST MOD 30 MIN: CPT | Mod: 25 | Performed by: INTERNAL MEDICINE

## 2024-02-05 PROCEDURE — 1126F AMNT PAIN NOTED NONE PRSNT: CPT | Performed by: INTERNAL MEDICINE

## 2024-02-05 PROCEDURE — 99214 OFFICE O/P EST MOD 30 MIN: CPT | Performed by: INTERNAL MEDICINE

## 2024-02-05 PROCEDURE — 1036F TOBACCO NON-USER: CPT | Performed by: INTERNAL MEDICINE

## 2024-02-05 PROCEDURE — 3079F DIAST BP 80-89 MM HG: CPT | Performed by: INTERNAL MEDICINE

## 2024-02-05 PROCEDURE — 93005 ELECTROCARDIOGRAM TRACING: CPT

## 2024-02-05 PROCEDURE — 3077F SYST BP >= 140 MM HG: CPT | Performed by: INTERNAL MEDICINE

## 2024-02-05 PROCEDURE — 1159F MED LIST DOCD IN RCRD: CPT | Performed by: INTERNAL MEDICINE

## 2024-02-05 PROCEDURE — 93010 ELECTROCARDIOGRAM REPORT: CPT | Performed by: INTERNAL MEDICINE

## 2024-02-05 PROCEDURE — 1160F RVW MEDS BY RX/DR IN RCRD: CPT | Performed by: INTERNAL MEDICINE

## 2024-02-05 ASSESSMENT — ENCOUNTER SYMPTOMS
DEPRESSION: 0
LOSS OF SENSATION IN FEET: 0
OCCASIONAL FEELINGS OF UNSTEADINESS: 0

## 2024-02-05 ASSESSMENT — PATIENT HEALTH QUESTIONNAIRE - PHQ9
2. FEELING DOWN, DEPRESSED OR HOPELESS: NOT AT ALL
SUM OF ALL RESPONSES TO PHQ9 QUESTIONS 1 AND 2: 0
1. LITTLE INTEREST OR PLEASURE IN DOING THINGS: NOT AT ALL

## 2024-02-05 ASSESSMENT — PAIN SCALES - GENERAL: PAINLEVEL: 0-NO PAIN

## 2024-02-05 NOTE — PROGRESS NOTES
Subjective:  Patient returns for a routine 6-month follow-up.  We follow her for known coronary disease as well as hypertension and hyperlipidemia.  I was pleased to see she has been clinically stable since her last visit.  She underwent mid to distal RCA stenting back in 2015.  She had more complete revascularization shortly after that with a mid LAD stent.  She has been doing well clinically since that time.  She remains compliant with her dual antiplatelet therapy without any bleeding problems.  She has not had any recurrent angina at a good activity level.  She remains on appropriate lipid-lowering therapy with low-dose atorvastatin as well as Repatha.  Her lipids panels have generally looked excellent.  She has not had any hospitalizations and denies any other new health concerns.  She overall is quite happy with how she is doing.  She did not need any prescriptions renewed.    Objective:  General: Alert, usual pleasant self.  HEENT: Unchanged.  Lungs: Clear without crackles or wheezing.  Cardiac: Distant heart tones without murmur rub or S3.  Abdomen: Nontender with normal bowel sounds.  Extremities: No edema.  Skin: No rash.  Neuro: Intact without change.    EKG: Normal sinus rhythm.  Within normal limits.    Lipid panel: Cholesterol-158, HDL-76, LDL-47, TG-174.    Impression/plan:  Isabella remains clinically stable at this time.  She remains free of angina following remote multivessel stenting.  Her blood pressure remains a bit elevated so I will have her check this several times a month and alert me should she get any concerning readings.  I suspect we may need to sort it if we need to escalate her antihypertensive therapy at some point.    I will keep her on dual antiplatelet therapy given her extensive coronary disease.  I was also delighted to see that her lipid panel looks excellent on combination therapy.  I did not think we needed to embark on any repeat cardiovascular testing at this time and did not  want to make any medication changes today.  I will see her back for routine follow-up in 3 months assuming no intercurrent problems.    Patient instructions:    Continue current medications unchanged.    Monitor your blood pressure as directed and call for any concerning readings.    Return to clinic in 3 months.

## 2024-02-09 LAB
ATRIAL RATE: 78 BPM
P AXIS: 51 DEGREES
P OFFSET: 205 MS
P ONSET: 139 MS
PR INTERVAL: 158 MS
Q ONSET: 218 MS
QRS COUNT: 13 BEATS
QRS DURATION: 84 MS
QT INTERVAL: 374 MS
QTC CALCULATION(BAZETT): 426 MS
QTC FREDERICIA: 408 MS
R AXIS: 56 DEGREES
T AXIS: 37 DEGREES
T OFFSET: 405 MS
VENTRICULAR RATE: 78 BPM

## 2024-02-12 ENCOUNTER — SPECIALTY PHARMACY (OUTPATIENT)
Dept: PHARMACY | Facility: CLINIC | Age: 69
End: 2024-02-12

## 2024-02-13 DIAGNOSIS — E55.9 VITAMIN D DEFICIENCY: ICD-10-CM

## 2024-02-13 DIAGNOSIS — K21.9 GASTROESOPHAGEAL REFLUX DISEASE, UNSPECIFIED WHETHER ESOPHAGITIS PRESENT: ICD-10-CM

## 2024-02-13 RX ORDER — ERGOCALCIFEROL 1.25 MG/1
1.25 CAPSULE ORAL
Qty: 12 CAPSULE | Refills: 0 | Status: SHIPPED | OUTPATIENT
Start: 2024-02-13

## 2024-02-13 RX ORDER — FAMOTIDINE 20 MG/1
20 TABLET, FILM COATED ORAL DAILY
Qty: 30 TABLET | Refills: 2 | Status: SHIPPED | OUTPATIENT
Start: 2024-02-13 | End: 2024-06-10 | Stop reason: SDUPTHER

## 2024-03-11 ENCOUNTER — SPECIALTY PHARMACY (OUTPATIENT)
Dept: PHARMACY | Facility: CLINIC | Age: 69
End: 2024-03-11

## 2024-03-11 ENCOUNTER — PHARMACY VISIT (OUTPATIENT)
Dept: PHARMACY | Facility: CLINIC | Age: 69
End: 2024-03-11
Payer: COMMERCIAL

## 2024-03-11 PROCEDURE — RXMED WILLOW AMBULATORY MEDICATION CHARGE

## 2024-04-03 PROCEDURE — RXMED WILLOW AMBULATORY MEDICATION CHARGE

## 2024-04-05 ENCOUNTER — SPECIALTY PHARMACY (OUTPATIENT)
Dept: PHARMACY | Facility: CLINIC | Age: 69
End: 2024-04-05

## 2024-04-10 ENCOUNTER — PHARMACY VISIT (OUTPATIENT)
Dept: PHARMACY | Facility: CLINIC | Age: 69
End: 2024-04-10
Payer: COMMERCIAL

## 2024-05-03 ENCOUNTER — SPECIALTY PHARMACY (OUTPATIENT)
Dept: PHARMACY | Facility: CLINIC | Age: 69
End: 2024-05-03

## 2024-05-03 PROCEDURE — RXMED WILLOW AMBULATORY MEDICATION CHARGE

## 2024-05-17 ENCOUNTER — PHARMACY VISIT (OUTPATIENT)
Dept: PHARMACY | Facility: CLINIC | Age: 69
End: 2024-05-17
Payer: COMMERCIAL

## 2024-05-23 ENCOUNTER — TELEPHONE (OUTPATIENT)
Dept: CARDIOLOGY | Facility: CLINIC | Age: 69
End: 2024-05-23
Payer: MEDICARE

## 2024-06-07 ENCOUNTER — SPECIALTY PHARMACY (OUTPATIENT)
Dept: PHARMACY | Facility: CLINIC | Age: 69
End: 2024-06-07

## 2024-06-07 DIAGNOSIS — E78.5 HYPERLIPIDEMIA, UNSPECIFIED HYPERLIPIDEMIA TYPE: ICD-10-CM

## 2024-06-07 DIAGNOSIS — I25.10 CORONARY ARTERY DISEASE INVOLVING NATIVE HEART, UNSPECIFIED VESSEL OR LESION TYPE, UNSPECIFIED WHETHER ANGINA PRESENT: ICD-10-CM

## 2024-06-10 ENCOUNTER — SPECIALTY PHARMACY (OUTPATIENT)
Dept: PHARMACY | Facility: CLINIC | Age: 69
End: 2024-06-10

## 2024-06-10 DIAGNOSIS — K21.9 GASTROESOPHAGEAL REFLUX DISEASE, UNSPECIFIED WHETHER ESOPHAGITIS PRESENT: ICD-10-CM

## 2024-06-10 PROCEDURE — RXMED WILLOW AMBULATORY MEDICATION CHARGE

## 2024-06-10 RX ORDER — FAMOTIDINE 20 MG/1
20 TABLET, FILM COATED ORAL DAILY
Qty: 30 TABLET | Refills: 2 | Status: SHIPPED | OUTPATIENT
Start: 2024-06-10 | End: 2024-12-07

## 2024-06-10 RX ORDER — EVOLOCUMAB 140 MG/ML
INJECTION, SOLUTION SUBCUTANEOUS
Qty: 2 ML | Refills: 11 | Status: SHIPPED | OUTPATIENT
Start: 2024-06-10 | End: 2025-06-10

## 2024-06-11 ENCOUNTER — PHARMACY VISIT (OUTPATIENT)
Dept: PHARMACY | Facility: CLINIC | Age: 69
End: 2024-06-11
Payer: COMMERCIAL

## 2024-06-12 ENCOUNTER — APPOINTMENT (OUTPATIENT)
Dept: PRIMARY CARE | Facility: CLINIC | Age: 69
End: 2024-06-12
Payer: MEDICARE

## 2024-06-12 VITALS
HEIGHT: 66 IN | DIASTOLIC BLOOD PRESSURE: 79 MMHG | OXYGEN SATURATION: 95 % | SYSTOLIC BLOOD PRESSURE: 116 MMHG | BODY MASS INDEX: 27.8 KG/M2 | HEART RATE: 67 BPM | WEIGHT: 173 LBS

## 2024-06-12 DIAGNOSIS — I25.10 CORONARY ARTERY DISEASE INVOLVING NATIVE HEART, UNSPECIFIED VESSEL OR LESION TYPE, UNSPECIFIED WHETHER ANGINA PRESENT: ICD-10-CM

## 2024-06-12 DIAGNOSIS — L57.0 ACTINIC KERATOSIS: ICD-10-CM

## 2024-06-12 DIAGNOSIS — Z00.00 WELLNESS EXAMINATION: ICD-10-CM

## 2024-06-12 DIAGNOSIS — Z78.0 ASYMPTOMATIC MENOPAUSAL STATE: ICD-10-CM

## 2024-06-12 DIAGNOSIS — I10 BENIGN ESSENTIAL HYPERTENSION: Primary | ICD-10-CM

## 2024-06-12 DIAGNOSIS — E55.9 VITAMIN D DEFICIENCY: ICD-10-CM

## 2024-06-12 DIAGNOSIS — Z12.31 ENCOUNTER FOR SCREENING MAMMOGRAM FOR MALIGNANT NEOPLASM OF BREAST: ICD-10-CM

## 2024-06-12 DIAGNOSIS — Z00.00 MEDICARE ANNUAL WELLNESS VISIT, SUBSEQUENT: ICD-10-CM

## 2024-06-12 PROBLEM — M12.811 ROTATOR CUFF ARTHROPATHY OF RIGHT SHOULDER: Status: ACTIVE | Noted: 2024-06-12

## 2024-06-12 PROBLEM — K57.32 DIVERTICULITIS OF COLON: Status: ACTIVE | Noted: 2023-07-12

## 2024-06-12 PROBLEM — R25.2 SPASM: Status: ACTIVE | Noted: 2024-06-12

## 2024-06-12 PROBLEM — J02.9 PHARYNGITIS: Status: ACTIVE | Noted: 2024-06-12

## 2024-06-12 PROBLEM — R19.7 DIARRHEA: Status: ACTIVE | Noted: 2024-06-12

## 2024-06-12 PROBLEM — R53.83 FATIGUE: Status: ACTIVE | Noted: 2024-06-12

## 2024-06-12 PROBLEM — I78.1 SENILE ANGIOMA: Status: ACTIVE | Noted: 2024-06-12

## 2024-06-12 PROBLEM — L57.8 PHOTOAGED SKIN: Status: ACTIVE | Noted: 2024-06-12

## 2024-06-12 PROBLEM — B34.9 VIRAL INFECTION: Status: ACTIVE | Noted: 2024-06-12

## 2024-06-12 PROBLEM — L91.8 SKIN TAG: Status: ACTIVE | Noted: 2024-06-12

## 2024-06-12 PROBLEM — N39.0 URINARY TRACT INFECTION: Status: ACTIVE | Noted: 2024-06-12

## 2024-06-12 PROBLEM — L82.1 SEBORRHEIC KERATOSIS: Status: ACTIVE | Noted: 2024-06-12

## 2024-06-12 PROCEDURE — 1170F FXNL STATUS ASSESSED: CPT | Performed by: STUDENT IN AN ORGANIZED HEALTH CARE EDUCATION/TRAINING PROGRAM

## 2024-06-12 PROCEDURE — 1159F MED LIST DOCD IN RCRD: CPT | Performed by: STUDENT IN AN ORGANIZED HEALTH CARE EDUCATION/TRAINING PROGRAM

## 2024-06-12 PROCEDURE — G0439 PPPS, SUBSEQ VISIT: HCPCS | Performed by: STUDENT IN AN ORGANIZED HEALTH CARE EDUCATION/TRAINING PROGRAM

## 2024-06-12 PROCEDURE — 99214 OFFICE O/P EST MOD 30 MIN: CPT | Performed by: STUDENT IN AN ORGANIZED HEALTH CARE EDUCATION/TRAINING PROGRAM

## 2024-06-12 PROCEDURE — 99397 PER PM REEVAL EST PAT 65+ YR: CPT | Performed by: STUDENT IN AN ORGANIZED HEALTH CARE EDUCATION/TRAINING PROGRAM

## 2024-06-12 PROCEDURE — 3078F DIAST BP <80 MM HG: CPT | Performed by: STUDENT IN AN ORGANIZED HEALTH CARE EDUCATION/TRAINING PROGRAM

## 2024-06-12 PROCEDURE — 1124F ACP DISCUSS-NO DSCNMKR DOCD: CPT | Performed by: STUDENT IN AN ORGANIZED HEALTH CARE EDUCATION/TRAINING PROGRAM

## 2024-06-12 PROCEDURE — 3074F SYST BP LT 130 MM HG: CPT | Performed by: STUDENT IN AN ORGANIZED HEALTH CARE EDUCATION/TRAINING PROGRAM

## 2024-06-12 PROCEDURE — 1160F RVW MEDS BY RX/DR IN RCRD: CPT | Performed by: STUDENT IN AN ORGANIZED HEALTH CARE EDUCATION/TRAINING PROGRAM

## 2024-06-12 PROCEDURE — 1036F TOBACCO NON-USER: CPT | Performed by: STUDENT IN AN ORGANIZED HEALTH CARE EDUCATION/TRAINING PROGRAM

## 2024-06-12 RX ORDER — ERGOCALCIFEROL 1.25 MG/1
1.25 CAPSULE ORAL
Qty: 12 CAPSULE | Refills: 2 | Status: SHIPPED | OUTPATIENT
Start: 2024-06-16

## 2024-06-12 ASSESSMENT — ENCOUNTER SYMPTOMS
LOSS OF SENSATION IN FEET: 0
OCCASIONAL FEELINGS OF UNSTEADINESS: 0
DEPRESSION: 0

## 2024-06-12 ASSESSMENT — ACTIVITIES OF DAILY LIVING (ADL)
BATHING: INDEPENDENT
MANAGING_FINANCES: INDEPENDENT
DRESSING: INDEPENDENT
TAKING_MEDICATION: INDEPENDENT
DOING_HOUSEWORK: INDEPENDENT
GROCERY_SHOPPING: INDEPENDENT

## 2024-06-12 NOTE — PROGRESS NOTES
"Subjective   Reason for Visit: Isabella Dee is an 69 y.o. female here for a Medicare Wellness visit.          Reviewed all medications by prescribing practitioner or clinical pharmacist (such as prescriptions, OTCs, herbal therapies and supplements) and documented in the medical record.    HPI    Patient Care Team:  Inez Donovan PA-C as PCP - General  Inez Donovan PA-C as PCP - [employer] PCP     Review of Systems    Objective   Vitals:  Ht 1.676 m (5' 6\")   Wt 78.5 kg (173 lb)   BMI 27.92 kg/m²       Physical Exam    Assessment/Plan   Problem List Items Addressed This Visit    None           HPI: 61-year-old female presenting to [facility] care, follow-up on chronic conditions, Medicare annual wellness exam/CPE.    HTN, CAD  Stable, tolerating current regimen well.  Also follows with cardiology.    GERD  Stable, tolerating current regimen well.    SocHx:   - Smoking: Remote history over 40 years ago    Denies HA, changes in vision, chest pain, SOB/ALTMAN, palpitations, N/V/D/C, dysuria/hematuria, hematochezia/melena, paresthesias, LE edema.    12 point ROS reviewed and negative other than as stated in HPI      General: Alert, oriented, pleasant, in no acute distress  HEENT:      Head: normocephalic, atraumatic;      eyes: EOMI, no scleral icterus;   Neck: soft, supple, non-tender, no masses appreciated  CV: Heart with regular rate and rhythm, normal S1/S2, no murmurs  Lungs: CTAB without wheezing, rhonchi or rales; good respiratory effort, no increased work of breathing  Abdomen: soft, non-tender, non-distended, no masses appreciated  Extremities: no edema, no cyanosis  Neuro: Cranial nerves grossly intact; alert and oriented  Psych: Appropriate mood and affect    # HM  -CBC, CMP, Lipid panel, Vit D, TSH with reflex T4  -Vaccines:       Flu: Out of season      Shingrix: UTD      Pneumococcal: UTD      Tdap: Recommended, advised to go to local pharmacy  -Fortino w/ sherri: Ordered  -Lung cancer screening " with low-dose CT: Remote history over 40 years ago  -Colonoscopy: 2023, 5-year plan  -Osteoporosis screening: Ordered    #HTN  -Continue metoprolol tartrate 50 mg twice daily    #CAD  - Continue clopidogrel 75 mg daily, aspirin 81 mg daily atorvastatin 20 mg daily, Repatha 140 mg every 2 weeks  -Continue to follow cardiology    #GERD  -Continue famotidine    #Actinic keratosis   -Dermatology referral    F/U 4 months, sooner if indicated    Chris D'Amico, DO

## 2024-06-13 ENCOUNTER — LAB (OUTPATIENT)
Dept: LAB | Facility: LAB | Age: 69
End: 2024-06-13
Payer: MEDICARE

## 2024-06-13 DIAGNOSIS — I25.10 CORONARY ARTERY DISEASE INVOLVING NATIVE HEART, UNSPECIFIED VESSEL OR LESION TYPE, UNSPECIFIED WHETHER ANGINA PRESENT: ICD-10-CM

## 2024-06-13 DIAGNOSIS — Z00.00 MEDICARE ANNUAL WELLNESS VISIT, SUBSEQUENT: ICD-10-CM

## 2024-06-13 DIAGNOSIS — I10 BENIGN ESSENTIAL HYPERTENSION: ICD-10-CM

## 2024-06-13 DIAGNOSIS — Z00.00 WELLNESS EXAMINATION: ICD-10-CM

## 2024-06-13 DIAGNOSIS — E55.9 VITAMIN D DEFICIENCY: ICD-10-CM

## 2024-06-13 DIAGNOSIS — Z12.31 ENCOUNTER FOR SCREENING MAMMOGRAM FOR MALIGNANT NEOPLASM OF BREAST: ICD-10-CM

## 2024-06-13 DIAGNOSIS — Z78.0 ASYMPTOMATIC MENOPAUSAL STATE: ICD-10-CM

## 2024-06-13 LAB
25(OH)D3 SERPL-MCNC: 60 NG/ML (ref 30–100)
ALBUMIN SERPL BCP-MCNC: 4.4 G/DL (ref 3.4–5)
ALP SERPL-CCNC: 73 U/L (ref 33–136)
ALT SERPL W P-5'-P-CCNC: 39 U/L (ref 7–45)
ANION GAP SERPL CALC-SCNC: 14 MMOL/L (ref 10–20)
AST SERPL W P-5'-P-CCNC: 30 U/L (ref 9–39)
BILIRUB SERPL-MCNC: 0.7 MG/DL (ref 0–1.2)
BUN SERPL-MCNC: 17 MG/DL (ref 6–23)
CALCIUM SERPL-MCNC: 9.3 MG/DL (ref 8.6–10.6)
CHLORIDE SERPL-SCNC: 106 MMOL/L (ref 98–107)
CHOLEST SERPL-MCNC: 155 MG/DL (ref 0–199)
CHOLESTEROL/HDL RATIO: 2
CO2 SERPL-SCNC: 26 MMOL/L (ref 21–32)
CREAT SERPL-MCNC: 0.93 MG/DL (ref 0.5–1.05)
EGFRCR SERPLBLD CKD-EPI 2021: 67 ML/MIN/1.73M*2
ERYTHROCYTE [DISTWIDTH] IN BLOOD BY AUTOMATED COUNT: 12.9 % (ref 11.5–14.5)
GLUCOSE SERPL-MCNC: 101 MG/DL (ref 74–99)
HCT VFR BLD AUTO: 44.6 % (ref 36–46)
HDLC SERPL-MCNC: 78.7 MG/DL
HGB BLD-MCNC: 14.1 G/DL (ref 12–16)
LDLC SERPL CALC-MCNC: 52 MG/DL
MCH RBC QN AUTO: 31.3 PG (ref 26–34)
MCHC RBC AUTO-ENTMCNC: 31.6 G/DL (ref 32–36)
MCV RBC AUTO: 99 FL (ref 80–100)
NON HDL CHOLESTEROL: 76 MG/DL (ref 0–149)
NRBC BLD-RTO: 0 /100 WBCS (ref 0–0)
PLATELET # BLD AUTO: 232 X10*3/UL (ref 150–450)
POTASSIUM SERPL-SCNC: 4.7 MMOL/L (ref 3.5–5.3)
PROT SERPL-MCNC: 6.6 G/DL (ref 6.4–8.2)
RBC # BLD AUTO: 4.51 X10*6/UL (ref 4–5.2)
SODIUM SERPL-SCNC: 141 MMOL/L (ref 136–145)
TRIGL SERPL-MCNC: 124 MG/DL (ref 0–149)
TSH SERPL-ACNC: 1.16 MIU/L (ref 0.44–3.98)
VLDL: 25 MG/DL (ref 0–40)
WBC # BLD AUTO: 4.7 X10*3/UL (ref 4.4–11.3)

## 2024-06-13 PROCEDURE — 36415 COLL VENOUS BLD VENIPUNCTURE: CPT

## 2024-06-13 PROCEDURE — 82306 VITAMIN D 25 HYDROXY: CPT

## 2024-06-13 PROCEDURE — 80061 LIPID PANEL: CPT

## 2024-06-13 PROCEDURE — 84443 ASSAY THYROID STIM HORMONE: CPT

## 2024-06-13 PROCEDURE — 80053 COMPREHEN METABOLIC PANEL: CPT

## 2024-06-13 PROCEDURE — 85027 COMPLETE CBC AUTOMATED: CPT

## 2024-06-14 ENCOUNTER — LAB (OUTPATIENT)
Dept: LAB | Facility: LAB | Age: 69
End: 2024-06-14
Payer: MEDICARE

## 2024-06-15 NOTE — RESULT ENCOUNTER NOTE
Borderline blood glucose elevation at 101, continue to practice good dietary habits, including reduction in carbohydrates and processed foods.    Remaining labs all unremarkable.

## 2024-06-18 ENCOUNTER — TELEPHONE (OUTPATIENT)
Dept: PRIMARY CARE | Facility: CLINIC | Age: 69
End: 2024-06-18
Payer: MEDICARE

## 2024-06-18 NOTE — TELEPHONE ENCOUNTER
----- Message from Christopher D'Amico, DO sent at 6/15/2024  4:19 PM EDT -----  Borderline blood glucose elevation at 101, continue to practice good dietary habits, including reduction in carbohydrates and processed foods.    Remaining labs all unremarkable.

## 2024-06-18 NOTE — TELEPHONE ENCOUNTER
Result Communication    Resulted Orders   CBC   Result Value Ref Range    WBC 4.7 4.4 - 11.3 x10*3/uL    nRBC 0.0 0.0 - 0.0 /100 WBCs    RBC 4.51 4.00 - 5.20 x10*6/uL    Hemoglobin 14.1 12.0 - 16.0 g/dL    Hematocrit 44.6 36.0 - 46.0 %    MCV 99 80 - 100 fL    MCH 31.3 26.0 - 34.0 pg    MCHC 31.6 (L) 32.0 - 36.0 g/dL    RDW 12.9 11.5 - 14.5 %    Platelets 232 150 - 450 x10*3/uL   Lipid Panel   Result Value Ref Range    Cholesterol 155 0 - 199 mg/dL      Comment:            Age      Desirable   Borderline High   High     0-19 Y     0 - 169       170 - 199     >/= 200    20-24 Y     0 - 189       190 - 224     >/= 225         >24 Y     0 - 199       200 - 239     >/= 240   **All ranges are based on fasting samples. Specific   therapeutic targets will vary based on patient-specific   cardiac risk.    Pediatric guidelines reference:Pediatrics 2011, 128(S5).Adult guidelines reference: NCEP ATPIII Guidelines,FÉLIX 2001, 258:2486-97    Venipuncture immediately after or during the administration of Metamizole may lead to falsely low results. Testing should be performed immediately prior to Metamizole dosing.    HDL-Cholesterol 78.7 mg/dL      Comment:        Age       Very Low   Low     Normal    High    0-19 Y    < 35      < 40     40-45     ----  20-24 Y    ----     < 40      >45      ----        >24 Y      ----     < 40     40-60      >60      Cholesterol/HDL Ratio 2.0       Comment:        Ref Values  Desirable  < 3.4  High Risk  > 5.0    LDL Calculated 52 <=99 mg/dL      Comment:                                  Near   Borderline      AGE      Desirable  Optimal    High     High     Very High     0-19 Y     0 - 109     ---    110-129   >/= 130     ----    20-24 Y     0 - 119     ---    120-159   >/= 160     ----      >24 Y     0 -  99   100-129  130-159   160-189     >/=190      VLDL 25 0 - 40 mg/dL    Triglycerides 124 0 - 149 mg/dL      Comment:         Age         Desirable   Borderline High   High     Very High   0  D-90 D    19 - 174         ----         ----        ----  91 D- 9 Y     0 -  74        75 -  99     >/= 100      ----    10-19 Y     0 -  89        90 - 129     >/= 130      ----    20-24 Y     0 - 114       115 - 149     >/= 150      ----         >24 Y     0 - 149       150 - 199    200- 499    >/= 500    Venipuncture immediately after or during the administration of Metamizole may lead to falsely low results. Testing should be performed immediately prior to Metamizole dosing.    Non HDL Cholesterol 76 0 - 149 mg/dL      Comment:            Age       Desirable   Borderline High   High     Very High     0-19 Y     0 - 119       120 - 144     >/= 145    >/= 160    20-24 Y     0 - 149       150 - 189     >/= 190      ----         >24 Y    30 mg/dL above LDL Cholesterol goal     Comprehensive Metabolic Panel   Result Value Ref Range    Glucose 101 (H) 74 - 99 mg/dL    Sodium 141 136 - 145 mmol/L    Potassium 4.7 3.5 - 5.3 mmol/L    Chloride 106 98 - 107 mmol/L    Bicarbonate 26 21 - 32 mmol/L    Anion Gap 14 10 - 20 mmol/L    Urea Nitrogen 17 6 - 23 mg/dL    Creatinine 0.93 0.50 - 1.05 mg/dL    eGFR 67 >60 mL/min/1.73m*2      Comment:      Calculations of estimated GFR are performed using the 2021 CKD-EPI Study Refit equation without the race variable for the IDMS-Traceable creatinine methods.  https://jasn.asnjournals.org/content/early/2021/09/22/ASN.2673519329    Calcium 9.3 8.6 - 10.6 mg/dL    Albumin 4.4 3.4 - 5.0 g/dL    Alkaline Phosphatase 73 33 - 136 U/L    Total Protein 6.6 6.4 - 8.2 g/dL    AST 30 9 - 39 U/L    Bilirubin, Total 0.7 0.0 - 1.2 mg/dL    ALT 39 7 - 45 U/L      Comment:      Patients treated with Sulfasalazine may generate falsely decreased results for ALT.   TSH with reflex to Free T4 if abnormal   Result Value Ref Range    Thyroid Stimulating Hormone 1.16 0.44 - 3.98 mIU/L    Narrative    TSH testing is performed using different testing methodology at [facility] than at other  [facility]. Direct result comparisons should only be made within the same method.     Vitamin D 25-Hydroxy,Total (for eval of Vitamin D levels)   Result Value Ref Range    Vitamin D, 25-Hydroxy, Total 60 30 - 100 ng/mL    Narrative    Deficiency:         < 20   ng/ml  Insufficiency:      20-29  ng/ml  Sufficiency:         ng/ml  This assay accurately quantifies the sum of Vitamin D3, 25-Hydroxy and Vitamin D2,25-Hydroxy.       1:37 PM      Results were successfully communicated with the patient and they acknowledged their understanding.

## 2024-06-28 ENCOUNTER — HOSPITAL ENCOUNTER (OUTPATIENT)
Dept: RADIOLOGY | Facility: CLINIC | Age: 69
Discharge: HOME | End: 2024-06-28
Payer: MEDICARE

## 2024-06-28 VITALS — BODY MASS INDEX: 27.81 KG/M2 | WEIGHT: 173.06 LBS | HEIGHT: 66 IN

## 2024-06-28 DIAGNOSIS — Z12.31 ENCOUNTER FOR SCREENING MAMMOGRAM FOR MALIGNANT NEOPLASM OF BREAST: ICD-10-CM

## 2024-06-28 PROCEDURE — 77067 SCR MAMMO BI INCL CAD: CPT

## 2024-07-01 DIAGNOSIS — K21.9 GASTROESOPHAGEAL REFLUX DISEASE, UNSPECIFIED WHETHER ESOPHAGITIS PRESENT: ICD-10-CM

## 2024-07-01 RX ORDER — FAMOTIDINE 20 MG/1
20 TABLET, FILM COATED ORAL DAILY
Qty: 90 TABLET | Refills: 1 | Status: SHIPPED | OUTPATIENT
Start: 2024-07-01 | End: 2024-12-28

## 2024-07-03 ENCOUNTER — TELEPHONE (OUTPATIENT)
Dept: PRIMARY CARE | Facility: CLINIC | Age: 69
End: 2024-07-03
Payer: MEDICARE

## 2024-07-03 NOTE — TELEPHONE ENCOUNTER
----- Message from Christopher D'Amico, DO sent at 7/3/2024  1:15 PM EDT -----  Mammogram negative, continue with annual screening.   1 Principal Discharge DX:	Lower back pain

## 2024-07-03 NOTE — TELEPHONE ENCOUNTER
Result Communication    Resulted Orders   BI mammo bilateral screening tomosynthesis    Narrative    Interpreted By:  Meryl Ghosh,   STUDY:  BI MAMMO BILATERAL SCREENING TOMOSYNTHESIS;  6/28/2024 3:12 pm      ACCESSION NUMBER(S):  [id number]      ORDERING CLINICIAN:  CHRISTOPHER D'AMICO      INDICATION:  Screening.      COMPARISON:  12/05/2022, 12/03/2021      FINDINGS:  2D and tomosynthesis images were reviewed at 1 mm slice thickness.      Density:  There are areas of scattered fibroglandular tissue.      No suspicious masses or calcifications are identified.        Impression    No mammographic evidence of malignancy.      BI-RADS CATEGORY:  BI-RADS Category:  1 Negative.  Recommendation:  Annual Screening.  Recommended Date:  1 Year.  Laterality:  Bilateral.              For any future breast imaging appointments, please call [phone]).          MACRO:  None      Signed by: Meryl Ghosh 7/3/2024 12:56 PM  Dictation workstation:   [id number]       1:44 PM      Results were successfully communicated with the patient and they acknowledged their understanding.

## 2024-07-08 PROCEDURE — RXMED WILLOW AMBULATORY MEDICATION CHARGE

## 2024-07-09 ENCOUNTER — PHARMACY VISIT (OUTPATIENT)
Dept: PHARMACY | Facility: CLINIC | Age: 69
End: 2024-07-09
Payer: COMMERCIAL

## 2024-07-29 ENCOUNTER — SPECIALTY PHARMACY (OUTPATIENT)
Dept: PHARMACY | Facility: CLINIC | Age: 69
End: 2024-07-29

## 2024-08-01 PROCEDURE — RXMED WILLOW AMBULATORY MEDICATION CHARGE

## 2024-08-06 ENCOUNTER — PHARMACY VISIT (OUTPATIENT)
Dept: PHARMACY | Facility: CLINIC | Age: 69
End: 2024-08-06
Payer: COMMERCIAL

## 2024-08-12 DIAGNOSIS — I25.10 CAD (CORONARY ARTERY DISEASE): Primary | ICD-10-CM

## 2024-08-12 RX ORDER — ATORVASTATIN CALCIUM 20 MG/1
20 TABLET, FILM COATED ORAL
Qty: 90 TABLET | Refills: 3 | Status: SHIPPED | OUTPATIENT
Start: 2024-08-12 | End: 2025-08-12

## 2024-08-29 ENCOUNTER — SPECIALTY PHARMACY (OUTPATIENT)
Dept: PHARMACY | Facility: CLINIC | Age: 69
End: 2024-08-29

## 2024-08-29 PROCEDURE — RXMED WILLOW AMBULATORY MEDICATION CHARGE

## 2024-09-04 ENCOUNTER — PHARMACY VISIT (OUTPATIENT)
Dept: PHARMACY | Facility: CLINIC | Age: 69
End: 2024-09-04
Payer: COMMERCIAL

## 2024-09-06 ENCOUNTER — HOSPITAL ENCOUNTER (OUTPATIENT)
Dept: RADIOLOGY | Facility: CLINIC | Age: 69
Discharge: HOME | End: 2024-09-06
Payer: MEDICARE

## 2024-09-06 DIAGNOSIS — Z78.0 ASYMPTOMATIC MENOPAUSAL STATE: ICD-10-CM

## 2024-09-06 PROCEDURE — 77080 DXA BONE DENSITY AXIAL: CPT

## 2024-09-06 ASSESSMENT — LIFESTYLE VARIABLES
3_OR_MORE_DRINKS_PER_DAY: N
CURRENT_SMOKER: N

## 2024-09-09 DIAGNOSIS — I10 BENIGN ESSENTIAL HYPERTENSION: ICD-10-CM

## 2024-09-09 DIAGNOSIS — I25.10 ATHEROSCLEROSIS OF NATIVE CORONARY ARTERY, UNSPECIFIED WHETHER ANGINA PRESENT, UNSPECIFIED WHETHER NATIVE OR TRANSPLANTED HEART: Primary | ICD-10-CM

## 2024-09-09 RX ORDER — METOPROLOL TARTRATE 50 MG/1
50 TABLET ORAL 2 TIMES DAILY
Qty: 180 TABLET | Refills: 3 | Status: SHIPPED | OUTPATIENT
Start: 2024-09-09

## 2024-09-09 RX ORDER — CLOPIDOGREL BISULFATE 75 MG/1
75 TABLET ORAL
Qty: 90 TABLET | Refills: 3 | Status: SHIPPED | OUTPATIENT
Start: 2024-09-09

## 2024-09-10 ENCOUNTER — TELEPHONE (OUTPATIENT)
Dept: PRIMARY CARE | Facility: CLINIC | Age: 69
End: 2024-09-10
Payer: MEDICARE

## 2024-09-10 NOTE — TELEPHONE ENCOUNTER
----- Message from Christopher D'Amico sent at 9/9/2024  2:43 PM EDT -----  DEXA scan shows osteopenia, which is decreased bone density without being enough to be considered osteoporosis.  Should be on calcium supplementation of 1200 mg daily, and if not already on vitamin D supplementation, at least 600 international units daily.  Weightbearing exercises such as weight lifting can help improve bone density, and discontinuing smoking if a smoker.  Continue with 2-year screenings.

## 2024-09-10 NOTE — TELEPHONE ENCOUNTER
Result Communication    Resulted Orders   XR DEXA bone density    Narrative    Interpreted By:  Lashawn Fernandez,   STUDY:  DEXA BONE DENSITY9/6/2024 8:31 am      INDICATION:  Signs/Symptoms:Screen. The patient is a 68 y/o  year old F.      COMPARISON:  No comparison available.      ACCESSION NUMBER(S):  [id number]      ORDERING CLINICIAN:  CHRISTOPHER D'AMICO      TECHNIQUE:  DEXA BONE DENSITY      FINDINGS:  SPINE L1-L4  Bone Mineral Density: 0.853  T-Score -1.8  Z-Score 0.2  Bone Mineral Density change vs baseline:  Not reported  Bone Mineral Density change vs previous: Not reported      LEFT FEMUR -TOTAL  Bone Mineral Density: 0.836  T-Score -0.9   Z-Score  0.6  Bone Mineral Density change vs baseline: Not reported  Bone Mineral Density change vs previous: Not reported      LEFT FEMUR -NECK  Bone Mineral Density: 0.723  T-Score -1.1  Z-Score 0.6          World Health Organization (WHO) criteria for post-menopausal,   Women:  Normal:         T-score at or above -1 SD  Osteopenia:   T-score between -1 and -2.5 SD  Osteoporosis: T-score at or below -2.5 SD          10-year Fracture Risk:  Major Osteoporotic Fracture  7.9  Hip Fracture                        0.8      Note:  If no FRAX score is reported, it is because:  Some T-score for Spine Total or Hip Total or Femoral Neck at or  below-2.5.      This exam was performed at [facility] on a [facility] Dexa Unit.            Impression    DEXA:  According to World Health Organization criteria,  classification is low bone mass (osteopenia)      Followup recommended in two years or sooner as clinically warranted.      All images and detailed analysis are available on the  Radiology  PACS.      MACRO:  None      Signed by: Lashawn Fernandez 9/6/2024 8:38 AM  Dictation workstation:   [id number]       3:41 PM      Results were not successfully communicated with the patient and they did not acknowledge their understanding. Phone rang; busy  signal; no vm set up

## 2024-09-26 ENCOUNTER — SPECIALTY PHARMACY (OUTPATIENT)
Dept: PHARMACY | Facility: CLINIC | Age: 69
End: 2024-09-26

## 2024-09-26 PROCEDURE — RXMED WILLOW AMBULATORY MEDICATION CHARGE

## 2024-09-30 ENCOUNTER — PHARMACY VISIT (OUTPATIENT)
Dept: PHARMACY | Facility: CLINIC | Age: 69
End: 2024-09-30
Payer: COMMERCIAL

## 2024-10-16 ENCOUNTER — APPOINTMENT (OUTPATIENT)
Dept: DERMATOLOGY | Facility: CLINIC | Age: 69
End: 2024-10-16
Payer: MEDICARE

## 2024-10-16 DIAGNOSIS — L57.0 ACTINIC KERATOSIS: Primary | ICD-10-CM
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
## 2024-10-16 RX ORDER — TRIAMCINOLONE ACETONIDE 1 MG/G
OINTMENT TOPICAL
Qty: 80 G | Refills: 2 | Status: SHIPPED | OUTPATIENT
Start: 2024-10-16

## 2024-10-16 NOTE — PROGRESS NOTES
Subjective     Isabella Dee is a 69 y.o. female who presents for the following: Suspicious Skin Lesion.     PCP was worried about a few spots on the face with concern for skin cancer.     Review of Systems:  No other skin or systemic complaints other than what is documented elsewhere in the note.    The following portions of the chart were reviewed this encounter and updated as appropriate:          Skin Cancer History  No skin cancer on file.      Specialty Problems          Dermatology Problems    Skin lesion    Photoaged skin    Seborrheic keratosis    Senile angioma    Skin tag        Objective   Well appearing patient in no apparent distress; mood and affect are within normal limits.    A focused skin examination was performed. All findings within normal limits unless otherwise noted below.    Assessment/Plan   1. Actinic keratosis (2)  Left Malar Cheek, Left Upper Eyelid  Erythematous macules with gritty scale.    Lesions are due to cumulative sun damage over time and are pre-cancerous. They have a risk (although small in majority of patients) of developing into squamous cell carcinoma and therefore treatment recommendations were offered and discussed.   Treatments Discussed include LN2, photodynamic (blue light) therapy & topical chemotherapy creams, risks and benefits of each.     The risks and benefits of LN2 were reviewed including incomplete removal, crusting, blister hypo and/or hyperpigmentation, scarring and recurrence. Pt elected for LN2 today    Destr of lesion - Left Malar Cheek, Left Upper Eyelid  Complexity: simple    Destruction method: cryotherapy    Informed consent: discussed and consent obtained    Lesion destroyed using liquid nitrogen: Yes    Region frozen until ice ball extended beyond lesion: Yes    Cryotherapy cycles:  1  Outcome: patient tolerated procedure well with no complications    Post-procedure details: wound care instructions given      2. Arthropod bite, initial encounter  (4)  Left Arm, Left Leg, Right Arm, Right Leg  Scattered erosions and scars    - Patient has exuberant reaction to bug bites and scratches them a lot  - No active bug bites today (just scarring), but will prescribe triamcinolone 0.1% ointment to use on bug bites for 2 weeks when active.   - Also counseled patient to take antihistamines to help with the itch.     Related Medications  triamcinolone (Kenalog) 0.1 % ointment  Apply to bug bites for 2 weeks at a time      Jenna Beard MD  PGY-4, Dermatology

## 2024-10-21 ENCOUNTER — APPOINTMENT (OUTPATIENT)
Dept: PRIMARY CARE | Facility: CLINIC | Age: 69
End: 2024-10-21
Payer: MEDICARE

## 2024-10-21 ENCOUNTER — LAB (OUTPATIENT)
Dept: LAB | Facility: LAB | Age: 69
End: 2024-10-21
Payer: MEDICARE

## 2024-10-21 VITALS
DIASTOLIC BLOOD PRESSURE: 84 MMHG | BODY MASS INDEX: 26.2 KG/M2 | SYSTOLIC BLOOD PRESSURE: 123 MMHG | HEIGHT: 66 IN | WEIGHT: 163 LBS | HEART RATE: 80 BPM | OXYGEN SATURATION: 94 %

## 2024-10-21 DIAGNOSIS — E55.9 VITAMIN D DEFICIENCY: ICD-10-CM

## 2024-10-21 DIAGNOSIS — K21.9 GASTROESOPHAGEAL REFLUX DISEASE, UNSPECIFIED WHETHER ESOPHAGITIS PRESENT: ICD-10-CM

## 2024-10-21 DIAGNOSIS — R73.9 HYPERGLYCEMIA: ICD-10-CM

## 2024-10-21 DIAGNOSIS — M62.838 MUSCLE SPASM: ICD-10-CM

## 2024-10-21 DIAGNOSIS — I10 BENIGN ESSENTIAL HYPERTENSION: ICD-10-CM

## 2024-10-21 DIAGNOSIS — I25.10 CORONARY ARTERY DISEASE INVOLVING NATIVE HEART, UNSPECIFIED VESSEL OR LESION TYPE, UNSPECIFIED WHETHER ANGINA PRESENT: ICD-10-CM

## 2024-10-21 DIAGNOSIS — I10 BENIGN ESSENTIAL HYPERTENSION: Primary | ICD-10-CM

## 2024-10-21 LAB
25(OH)D3 SERPL-MCNC: 60 NG/ML (ref 30–100)
ALBUMIN SERPL BCP-MCNC: 4.5 G/DL (ref 3.4–5)
ALP SERPL-CCNC: 67 U/L (ref 33–136)
ALT SERPL W P-5'-P-CCNC: 35 U/L (ref 7–45)
ANION GAP SERPL CALC-SCNC: 13 MMOL/L (ref 10–20)
AST SERPL W P-5'-P-CCNC: 27 U/L (ref 9–39)
BILIRUB SERPL-MCNC: 0.7 MG/DL (ref 0–1.2)
BUN SERPL-MCNC: 18 MG/DL (ref 6–23)
CALCIUM SERPL-MCNC: 9.6 MG/DL (ref 8.6–10.6)
CHLORIDE SERPL-SCNC: 104 MMOL/L (ref 98–107)
CHOLEST SERPL-MCNC: 175 MG/DL (ref 0–199)
CHOLESTEROL/HDL RATIO: 2.3
CO2 SERPL-SCNC: 27 MMOL/L (ref 21–32)
CREAT SERPL-MCNC: 0.99 MG/DL (ref 0.5–1.05)
EGFRCR SERPLBLD CKD-EPI 2021: 62 ML/MIN/1.73M*2
ERYTHROCYTE [DISTWIDTH] IN BLOOD BY AUTOMATED COUNT: 12.6 % (ref 11.5–14.5)
EST. AVERAGE GLUCOSE BLD GHB EST-MCNC: 111 MG/DL
GLUCOSE SERPL-MCNC: 96 MG/DL (ref 74–99)
HBA1C MFR BLD: 5.5 %
HCT VFR BLD AUTO: 44 % (ref 36–46)
HDLC SERPL-MCNC: 75.8 MG/DL
HGB BLD-MCNC: 14.3 G/DL (ref 12–16)
LDLC SERPL CALC-MCNC: 72 MG/DL
MCH RBC QN AUTO: 31.7 PG (ref 26–34)
MCHC RBC AUTO-ENTMCNC: 32.5 G/DL (ref 32–36)
MCV RBC AUTO: 98 FL (ref 80–100)
NON HDL CHOLESTEROL: 99 MG/DL (ref 0–149)
NRBC BLD-RTO: 0 /100 WBCS (ref 0–0)
PLATELET # BLD AUTO: 255 X10*3/UL (ref 150–450)
POTASSIUM SERPL-SCNC: 4.4 MMOL/L (ref 3.5–5.3)
PROT SERPL-MCNC: 7.1 G/DL (ref 6.4–8.2)
RBC # BLD AUTO: 4.51 X10*6/UL (ref 4–5.2)
SODIUM SERPL-SCNC: 140 MMOL/L (ref 136–145)
TRIGL SERPL-MCNC: 137 MG/DL (ref 0–149)
VLDL: 27 MG/DL (ref 0–40)
WBC # BLD AUTO: 4.9 X10*3/UL (ref 4.4–11.3)

## 2024-10-21 PROCEDURE — 36415 COLL VENOUS BLD VENIPUNCTURE: CPT

## 2024-10-21 PROCEDURE — 3008F BODY MASS INDEX DOCD: CPT | Performed by: STUDENT IN AN ORGANIZED HEALTH CARE EDUCATION/TRAINING PROGRAM

## 2024-10-21 PROCEDURE — 82306 VITAMIN D 25 HYDROXY: CPT

## 2024-10-21 PROCEDURE — 3079F DIAST BP 80-89 MM HG: CPT | Performed by: STUDENT IN AN ORGANIZED HEALTH CARE EDUCATION/TRAINING PROGRAM

## 2024-10-21 PROCEDURE — 80053 COMPREHEN METABOLIC PANEL: CPT

## 2024-10-21 PROCEDURE — G2211 COMPLEX E/M VISIT ADD ON: HCPCS | Performed by: STUDENT IN AN ORGANIZED HEALTH CARE EDUCATION/TRAINING PROGRAM

## 2024-10-21 PROCEDURE — 1036F TOBACCO NON-USER: CPT | Performed by: STUDENT IN AN ORGANIZED HEALTH CARE EDUCATION/TRAINING PROGRAM

## 2024-10-21 PROCEDURE — 80061 LIPID PANEL: CPT

## 2024-10-21 PROCEDURE — 3074F SYST BP LT 130 MM HG: CPT | Performed by: STUDENT IN AN ORGANIZED HEALTH CARE EDUCATION/TRAINING PROGRAM

## 2024-10-21 PROCEDURE — 1159F MED LIST DOCD IN RCRD: CPT | Performed by: STUDENT IN AN ORGANIZED HEALTH CARE EDUCATION/TRAINING PROGRAM

## 2024-10-21 PROCEDURE — 1160F RVW MEDS BY RX/DR IN RCRD: CPT | Performed by: STUDENT IN AN ORGANIZED HEALTH CARE EDUCATION/TRAINING PROGRAM

## 2024-10-21 PROCEDURE — 83036 HEMOGLOBIN GLYCOSYLATED A1C: CPT

## 2024-10-21 PROCEDURE — 99214 OFFICE O/P EST MOD 30 MIN: CPT | Performed by: STUDENT IN AN ORGANIZED HEALTH CARE EDUCATION/TRAINING PROGRAM

## 2024-10-21 PROCEDURE — 85027 COMPLETE CBC AUTOMATED: CPT

## 2024-10-21 RX ORDER — BRIMONIDINE TARTRATE 2 MG/ML
SOLUTION/ DROPS OPHTHALMIC
COMMUNITY
Start: 2024-09-10

## 2024-10-21 RX ORDER — CYCLOBENZAPRINE HCL 5 MG
5 TABLET ORAL 3 TIMES DAILY PRN
Qty: 30 TABLET | Refills: 2 | Status: SHIPPED | OUTPATIENT
Start: 2024-10-21

## 2024-10-21 RX ORDER — CYCLOBENZAPRINE HCL 5 MG
TABLET ORAL
COMMUNITY
End: 2024-10-21 | Stop reason: SDUPTHER

## 2024-10-21 RX ORDER — TIMOLOL MALEATE 5 MG/ML
SOLUTION/ DROPS OPHTHALMIC
COMMUNITY

## 2024-10-21 RX ORDER — ERGOCALCIFEROL 1.25 MG/1
1.25 CAPSULE ORAL
Qty: 12 CAPSULE | Refills: 2 | Status: CANCELLED | OUTPATIENT
Start: 2024-10-21

## 2024-10-21 ASSESSMENT — COLUMBIA-SUICIDE SEVERITY RATING SCALE - C-SSRS
1. IN THE PAST MONTH, HAVE YOU WISHED YOU WERE DEAD OR WISHED YOU COULD GO TO SLEEP AND NOT WAKE UP?: NO
2. HAVE YOU ACTUALLY HAD ANY THOUGHTS OF KILLING YOURSELF?: NO
6. HAVE YOU EVER DONE ANYTHING, STARTED TO DO ANYTHING, OR PREPARED TO DO ANYTHING TO END YOUR LIFE?: NO

## 2024-10-21 ASSESSMENT — ENCOUNTER SYMPTOMS
DEPRESSION: 0
LOSS OF SENSATION IN FEET: 0
OCCASIONAL FEELINGS OF UNSTEADINESS: 0

## 2024-10-21 NOTE — PROGRESS NOTES
69-year-old female presenting for follow-up on multiple concerns.    HTN, CAD  Stable, tolerating current regimen well.  Also follows with cardiology.    Left shoulder pain, neck pain  Did well with cyclobenzaprine, needs refills.    GERD  Stable, tolerating current regimen well.    SocHx:   - Smoking: Remote history over 40 years ago    Denies HA, changes in vision, chest pain, SOB/ALTMAN, palpitations, N/V/D/C, dysuria/hematuria, hematochezia/melena, paresthesias, LE edema.    12 point ROS reviewed and negative other than as stated in HPI      General: Alert, oriented, pleasant, in no acute distress  HEENT:      Head: normocephalic, atraumatic;      eyes: EOMI, no scleral icterus;   Neck: soft, supple, non-tender, no masses appreciated  CV: Heart with regular rate and rhythm, normal S1/S2, no murmurs  Lungs: CTAB without wheezing, rhonchi or rales; good respiratory effort, no increased work of breathing  Neuro: Cranial nerves grossly intact; alert and oriented  Psych: Appropriate mood and affect    #HTN  -BP at goal in office  -Continue metoprolol tartrate 50 mg twice daily    #CAD  - Continue clopidogrel 75 mg daily, aspirin 81 mg daily atorvastatin 20 mg daily, Repatha 140 mg every 2 weeks  -Continue to follow cardiology  -Repeat lipid panel    #Left shoulder pain #Neck pain  -Refill cyclobenzaprine 5 mg as needed    #GERD  -Continue famotidine    #Vitamin D deficiency  - Currently on 50,000 units weekly  - Repeat lab, if WNL, will likely go down to monthly or 2000 units daily    F/U 4 months, sooner if indicated, Medicare in June    Chris D'Amico, DO

## 2024-10-22 ENCOUNTER — TELEPHONE (OUTPATIENT)
Dept: PRIMARY CARE | Facility: CLINIC | Age: 69
End: 2024-10-22
Payer: MEDICARE

## 2024-10-22 ENCOUNTER — SPECIALTY PHARMACY (OUTPATIENT)
Dept: PHARMACY | Facility: CLINIC | Age: 69
End: 2024-10-22

## 2024-10-22 DIAGNOSIS — E55.9 VITAMIN D DEFICIENCY: ICD-10-CM

## 2024-10-22 PROCEDURE — RXMED WILLOW AMBULATORY MEDICATION CHARGE

## 2024-10-22 RX ORDER — ERGOCALCIFEROL 1.25 MG/1
1.25 CAPSULE ORAL
Qty: 3 CAPSULE | Refills: 3 | Status: SHIPPED | OUTPATIENT
Start: 2024-10-22 | End: 2025-10-22

## 2024-10-22 NOTE — RESULT ENCOUNTER NOTE
All blood work unremarkable.  Can go down to vitamin D 50,000 units once monthly.  Sent to pharmacy.

## 2024-10-22 NOTE — TELEPHONE ENCOUNTER
----- Message from Christopher D'Amico sent at 10/22/2024 10:18 AM EDT -----  All blood work unremarkable.  Can go down to vitamin D 50,000 units once monthly.  Sent to pharmacy.

## 2024-10-22 NOTE — TELEPHONE ENCOUNTER
Result Communication    Resulted Orders   CBC   Result Value Ref Range    WBC 4.9 4.4 - 11.3 x10*3/uL    nRBC 0.0 0.0 - 0.0 /100 WBCs    RBC 4.51 4.00 - 5.20 x10*6/uL    Hemoglobin 14.3 12.0 - 16.0 g/dL    Hematocrit 44.0 36.0 - 46.0 %    MCV 98 80 - 100 fL    MCH 31.7 26.0 - 34.0 pg    MCHC 32.5 32.0 - 36.0 g/dL    RDW 12.6 11.5 - 14.5 %    Platelets 255 150 - 450 x10*3/uL   Comprehensive Metabolic Panel   Result Value Ref Range    Glucose 96 74 - 99 mg/dL    Sodium 140 136 - 145 mmol/L    Potassium 4.4 3.5 - 5.3 mmol/L    Chloride 104 98 - 107 mmol/L    Bicarbonate 27 21 - 32 mmol/L    Anion Gap 13 10 - 20 mmol/L    Urea Nitrogen 18 6 - 23 mg/dL    Creatinine 0.99 0.50 - 1.05 mg/dL    eGFR 62 >60 mL/min/1.73m*2      Comment:      Calculations of estimated GFR are performed using the 2021 CKD-EPI Study Refit equation without the race variable for the IDMS-Traceable creatinine methods.  https://jasn.asnjournals.org/content/early/2021/09/22/ASN.5448134791    Calcium 9.6 8.6 - 10.6 mg/dL    Albumin 4.5 3.4 - 5.0 g/dL    Alkaline Phosphatase 67 33 - 136 U/L    Total Protein 7.1 6.4 - 8.2 g/dL    AST 27 9 - 39 U/L    Bilirubin, Total 0.7 0.0 - 1.2 mg/dL    ALT 35 7 - 45 U/L      Comment:      Patients treated with Sulfasalazine may generate falsely decreased results for ALT.   Lipid Panel   Result Value Ref Range    Cholesterol 175 0 - 199 mg/dL      Comment:            Age      Desirable   Borderline High   High     0-19 Y     0 - 169       170 - 199     >/= 200    20-24 Y     0 - 189       190 - 224     >/= 225         >24 Y     0 - 199       200 - 239     >/= 240   **All ranges are based on fasting samples. Specific   therapeutic targets will vary based on patient-specific   cardiac risk.    Pediatric guidelines reference:Pediatrics 2011, 128(S5).Adult guidelines reference: NCEP ATPIII Guidelines,FÉLIX 2001, 258:2486-97    Venipuncture immediately after or during the administration of Metamizole may lead to falsely  low results. Testing should be performed immediately prior to Metamizole dosing.    HDL-Cholesterol 75.8 mg/dL      Comment:        Age       Very Low   Low     Normal    High    0-19 Y    < 35      < 40     40-45     ----  20-24 Y    ----     < 40      >45      ----        >24 Y      ----     < 40     40-60      >60      Cholesterol/HDL Ratio 2.3       Comment:        Ref Values  Desirable  < 3.4  High Risk  > 5.0    LDL Calculated 72 <=99 mg/dL      Comment:                                  Near   Borderline      AGE      Desirable  Optimal    High     High     Very High     0-19 Y     0 - 109     ---    110-129   >/= 130     ----    20-24 Y     0 - 119     ---    120-159   >/= 160     ----      >24 Y     0 -  99   100-129  130-159   160-189     >/=190      VLDL 27 0 - 40 mg/dL    Triglycerides 137 0 - 149 mg/dL      Comment:         Age         Desirable   Borderline High   High     Very High   0 D-90 D    19 - 174         ----         ----        ----  91 D- 9 Y     0 -  74        75 -  99     >/= 100      ----    10-19 Y     0 -  89        90 - 129     >/= 130      ----    20-24 Y     0 - 114       115 - 149     >/= 150      ----         >24 Y     0 - 149       150 - 199    200- 499    >/= 500    Venipuncture immediately after or during the administration of Metamizole may lead to falsely low results. Testing should be performed immediately prior to Metamizole dosing.    Non HDL Cholesterol 99 0 - 149 mg/dL      Comment:            Age       Desirable   Borderline High   High     Very High     0-19 Y     0 - 119       120 - 144     >/= 145    >/= 160    20-24 Y     0 - 149       150 - 189     >/= 190      ----         >24 Y    30 mg/dL above LDL Cholesterol goal     Hemoglobin A1C   Result Value Ref Range    Hemoglobin A1C 5.5 See comment %    Estimated Average Glucose 111 Not Established mg/dL    Narrative    Diagnosis of Diabetes-Adults  Non-Diabetic: < or = 5.6%  Increased risk for developing diabetes:  5.7-6.4%  Diagnostic of diabetes: > or = 6.5%       Vitamin D 25-Hydroxy,Total (for eval of Vitamin D levels)   Result Value Ref Range    Vitamin D, 25-Hydroxy, Total 60 30 - 100 ng/mL    Narrative    Deficiency:         < 20   ng/ml  Insufficiency:      20-29  ng/ml  Sufficiency:         ng/ml  This assay accurately quantifies the sum of Vitamin D3, 25-Hydroxy and Vitamin D2,25-Hydroxy.       3:42 PM      Results were successfully communicated with the patient and they acknowledged their understanding.

## 2024-10-25 ENCOUNTER — PHARMACY VISIT (OUTPATIENT)
Dept: PHARMACY | Facility: CLINIC | Age: 69
End: 2024-10-25
Payer: COMMERCIAL

## 2024-11-19 ENCOUNTER — SPECIALTY PHARMACY (OUTPATIENT)
Dept: PHARMACY | Facility: CLINIC | Age: 69
End: 2024-11-19

## 2024-11-19 PROCEDURE — RXMED WILLOW AMBULATORY MEDICATION CHARGE

## 2024-11-21 ENCOUNTER — PHARMACY VISIT (OUTPATIENT)
Dept: PHARMACY | Facility: CLINIC | Age: 69
End: 2024-11-21
Payer: COMMERCIAL

## 2024-12-14 ENCOUNTER — SPECIALTY PHARMACY (OUTPATIENT)
Dept: PHARMACY | Facility: CLINIC | Age: 69
End: 2024-12-14

## 2024-12-14 PROCEDURE — RXMED WILLOW AMBULATORY MEDICATION CHARGE

## 2024-12-18 ENCOUNTER — PHARMACY VISIT (OUTPATIENT)
Dept: PHARMACY | Facility: CLINIC | Age: 69
End: 2024-12-18
Payer: COMMERCIAL

## 2025-01-13 ENCOUNTER — SPECIALTY PHARMACY (OUTPATIENT)
Dept: PHARMACY | Facility: CLINIC | Age: 70
End: 2025-01-13

## 2025-01-13 PROCEDURE — RXMED WILLOW AMBULATORY MEDICATION CHARGE

## 2025-01-16 ENCOUNTER — PHARMACY VISIT (OUTPATIENT)
Dept: PHARMACY | Facility: CLINIC | Age: 70
End: 2025-01-16
Payer: COMMERCIAL

## 2025-02-07 ENCOUNTER — SPECIALTY PHARMACY (OUTPATIENT)
Dept: PHARMACY | Facility: CLINIC | Age: 70
End: 2025-02-07

## 2025-02-07 PROCEDURE — RXMED WILLOW AMBULATORY MEDICATION CHARGE

## 2025-02-10 ENCOUNTER — PHARMACY VISIT (OUTPATIENT)
Dept: PHARMACY | Facility: CLINIC | Age: 70
End: 2025-02-10
Payer: COMMERCIAL

## 2025-02-24 ENCOUNTER — APPOINTMENT (OUTPATIENT)
Dept: PRIMARY CARE | Facility: CLINIC | Age: 70
End: 2025-02-24
Payer: MEDICARE

## 2025-02-24 VITALS
BODY MASS INDEX: 25.71 KG/M2 | HEIGHT: 66 IN | HEART RATE: 76 BPM | WEIGHT: 160 LBS | SYSTOLIC BLOOD PRESSURE: 140 MMHG | DIASTOLIC BLOOD PRESSURE: 90 MMHG | OXYGEN SATURATION: 96 %

## 2025-02-24 DIAGNOSIS — K21.9 GASTROESOPHAGEAL REFLUX DISEASE, UNSPECIFIED WHETHER ESOPHAGITIS PRESENT: ICD-10-CM

## 2025-02-24 DIAGNOSIS — I10 BENIGN ESSENTIAL HYPERTENSION: Primary | ICD-10-CM

## 2025-02-24 DIAGNOSIS — H40.9 GLAUCOMA, UNSPECIFIED GLAUCOMA TYPE, UNSPECIFIED LATERALITY: ICD-10-CM

## 2025-02-24 DIAGNOSIS — M47.812 CERVICAL SPONDYLOSIS WITHOUT MYELOPATHY: ICD-10-CM

## 2025-02-24 DIAGNOSIS — I25.10 CORONARY ARTERY DISEASE INVOLVING NATIVE HEART, UNSPECIFIED VESSEL OR LESION TYPE, UNSPECIFIED WHETHER ANGINA PRESENT: ICD-10-CM

## 2025-02-24 DIAGNOSIS — E55.9 VITAMIN D DEFICIENCY: ICD-10-CM

## 2025-02-24 DIAGNOSIS — M62.838 MUSCLE SPASM: ICD-10-CM

## 2025-02-24 PROBLEM — H40.10X0 OPEN-ANGLE GLAUCOMA OF RIGHT EYE: Status: ACTIVE | Noted: 2024-09-10

## 2025-02-24 PROBLEM — Z96.1 PSEUDOPHAKIA: Status: ACTIVE | Noted: 2024-09-10

## 2025-02-24 PROCEDURE — 3008F BODY MASS INDEX DOCD: CPT | Performed by: STUDENT IN AN ORGANIZED HEALTH CARE EDUCATION/TRAINING PROGRAM

## 2025-02-24 PROCEDURE — 3080F DIAST BP >= 90 MM HG: CPT | Performed by: STUDENT IN AN ORGANIZED HEALTH CARE EDUCATION/TRAINING PROGRAM

## 2025-02-24 PROCEDURE — G2211 COMPLEX E/M VISIT ADD ON: HCPCS | Performed by: STUDENT IN AN ORGANIZED HEALTH CARE EDUCATION/TRAINING PROGRAM

## 2025-02-24 PROCEDURE — 1036F TOBACCO NON-USER: CPT | Performed by: STUDENT IN AN ORGANIZED HEALTH CARE EDUCATION/TRAINING PROGRAM

## 2025-02-24 PROCEDURE — 1124F ACP DISCUSS-NO DSCNMKR DOCD: CPT | Performed by: STUDENT IN AN ORGANIZED HEALTH CARE EDUCATION/TRAINING PROGRAM

## 2025-02-24 PROCEDURE — 1159F MED LIST DOCD IN RCRD: CPT | Performed by: STUDENT IN AN ORGANIZED HEALTH CARE EDUCATION/TRAINING PROGRAM

## 2025-02-24 PROCEDURE — 99214 OFFICE O/P EST MOD 30 MIN: CPT | Performed by: STUDENT IN AN ORGANIZED HEALTH CARE EDUCATION/TRAINING PROGRAM

## 2025-02-24 PROCEDURE — 1160F RVW MEDS BY RX/DR IN RCRD: CPT | Performed by: STUDENT IN AN ORGANIZED HEALTH CARE EDUCATION/TRAINING PROGRAM

## 2025-02-24 PROCEDURE — 3077F SYST BP >= 140 MM HG: CPT | Performed by: STUDENT IN AN ORGANIZED HEALTH CARE EDUCATION/TRAINING PROGRAM

## 2025-02-24 RX ORDER — BRINZOLAMIDE/BRIMONIDINE TARTRATE 10; 2 MG/ML; MG/ML
SUSPENSION/ DROPS OPHTHALMIC
COMMUNITY
Start: 2025-02-11

## 2025-02-24 RX ORDER — CYCLOBENZAPRINE HCL 5 MG
5 TABLET ORAL 3 TIMES DAILY PRN
Qty: 30 TABLET | Refills: 2 | Status: SHIPPED | OUTPATIENT
Start: 2025-02-24

## 2025-02-24 ASSESSMENT — ENCOUNTER SYMPTOMS
LOSS OF SENSATION IN FEET: 0
OCCASIONAL FEELINGS OF UNSTEADINESS: 0
DEPRESSION: 0

## 2025-02-24 ASSESSMENT — COLUMBIA-SUICIDE SEVERITY RATING SCALE - C-SSRS
2. HAVE YOU ACTUALLY HAD ANY THOUGHTS OF KILLING YOURSELF?: NO
6. HAVE YOU EVER DONE ANYTHING, STARTED TO DO ANYTHING, OR PREPARED TO DO ANYTHING TO END YOUR LIFE?: NO
1. IN THE PAST MONTH, HAVE YOU WISHED YOU WERE DEAD OR WISHED YOU COULD GO TO SLEEP AND NOT WAKE UP?: NO

## 2025-02-24 ASSESSMENT — PATIENT HEALTH QUESTIONNAIRE - PHQ9
1. LITTLE INTEREST OR PLEASURE IN DOING THINGS: NOT AT ALL
2. FEELING DOWN, DEPRESSED OR HOPELESS: NOT AT ALL
SUM OF ALL RESPONSES TO PHQ9 QUESTIONS 1 AND 2: 0

## 2025-02-24 NOTE — PROGRESS NOTES
70-year-old female presenting for follow-up on multiple concerns.    HTN, CAD  Stable, tolerating current regimen well.  Also follows with cardiology.    Left shoulder pain, neck pain  Stable on current regimen.    GERD  Stable, tolerating current regimen well.    Glaucoma  Would like referral for second opinion.    SocHx:   - Smoking: Remote history over 40 years ago    12 point ROS reviewed and negative other than as stated in HPI      General: Alert, oriented, pleasant, in no acute distress  HEENT:      Head: normocephalic, atraumatic;      eyes: EOMI, no scleral icterus;   Neck: soft, supple, non-tender, no masses appreciated  CV: Heart with regular rate and rhythm, normal S1/S2, no murmurs  Lungs: CTAB without wheezing, rhonchi or rales; good respiratory effort, no increased work of breathing  Neuro: Cranial nerves grossly intact; alert and oriented  Psych: Appropriate mood and affect    #HTN  -BP slightly above goal in office, at goal at last appointment  -Continue metoprolol tartrate 50 mg twice daily  -Repeat CMP    #CAD  - Continue clopidogrel 75 mg daily, aspirin 81 mg daily atorvastatin 20 mg daily, Repatha 140 mg every 2 weeks  -Continue to follow cardiology  -Repeat lipid panel    #Left shoulder pain #Neck pain  -Refill cyclobenzaprine 5 mg as needed    #GERD  -Continue famotidine    #Glaucoma  -Requesting referral for second opinion    #Vitamin D deficiency  - Currently on 50,000 units 3 times a month  - Repeat lab, if WNL, will likely go down to monthly or 2000 units daily    F/U 4 months, sooner if indicated, Medicare in June    Chris D'Amico, DO

## 2025-02-25 LAB
25(OH)D3+25(OH)D2 SERPL-MCNC: 44 NG/ML (ref 30–100)
ALBUMIN SERPL-MCNC: 4.8 G/DL (ref 3.6–5.1)
ALP SERPL-CCNC: 63 U/L (ref 37–153)
ALT SERPL-CCNC: 27 U/L (ref 6–29)
ANION GAP SERPL CALCULATED.4IONS-SCNC: 9 MMOL/L (CALC) (ref 7–17)
AST SERPL-CCNC: 27 U/L (ref 10–35)
BILIRUB SERPL-MCNC: 0.6 MG/DL (ref 0.2–1.2)
BUN SERPL-MCNC: 16 MG/DL (ref 7–25)
CALCIUM SERPL-MCNC: 9.5 MG/DL (ref 8.6–10.4)
CHLORIDE SERPL-SCNC: 107 MMOL/L (ref 98–110)
CHOLEST SERPL-MCNC: 181 MG/DL
CHOLEST/HDLC SERPL: 2 (CALC)
CO2 SERPL-SCNC: 25 MMOL/L (ref 20–32)
CREAT SERPL-MCNC: 0.94 MG/DL (ref 0.5–1.05)
EGFRCR SERPLBLD CKD-EPI 2021: 66 ML/MIN/1.73M2
ERYTHROCYTE [DISTWIDTH] IN BLOOD BY AUTOMATED COUNT: 12.1 % (ref 11–15)
GLUCOSE SERPL-MCNC: 93 MG/DL (ref 65–99)
HCT VFR BLD AUTO: 46.4 % (ref 35–45)
HDLC SERPL-MCNC: 92 MG/DL
HGB BLD-MCNC: 15.6 G/DL (ref 11.7–15.5)
LDLC SERPL CALC-MCNC: 67 MG/DL (CALC)
MCH RBC QN AUTO: 32.1 PG (ref 27–33)
MCHC RBC AUTO-ENTMCNC: 33.6 G/DL (ref 32–36)
MCV RBC AUTO: 95.5 FL (ref 80–100)
NONHDLC SERPL-MCNC: 89 MG/DL (CALC)
PLATELET # BLD AUTO: 262 THOUSAND/UL (ref 140–400)
PMV BLD REES-ECKER: 9.3 FL (ref 7.5–12.5)
POTASSIUM SERPL-SCNC: 4.9 MMOL/L (ref 3.5–5.3)
PROT SERPL-MCNC: 7.2 G/DL (ref 6.1–8.1)
RBC # BLD AUTO: 4.86 MILLION/UL (ref 3.8–5.1)
SODIUM SERPL-SCNC: 141 MMOL/L (ref 135–146)
TRIGL SERPL-MCNC: 137 MG/DL
TSH SERPL-ACNC: 1.68 MIU/L (ref 0.4–4.5)
WBC # BLD AUTO: 5.4 THOUSAND/UL (ref 3.8–10.8)

## 2025-02-26 NOTE — RESULT ENCOUNTER NOTE
Borderline elevation in hemoglobin and hematocrit.  Normal on past labs.  Not very concerning, will continue to monitor.    All remaining labs are within normal limits.

## 2025-03-05 ENCOUNTER — SPECIALTY PHARMACY (OUTPATIENT)
Dept: PHARMACY | Facility: CLINIC | Age: 70
End: 2025-03-05

## 2025-03-17 ENCOUNTER — PHARMACY VISIT (OUTPATIENT)
Dept: PHARMACY | Facility: CLINIC | Age: 70
End: 2025-03-17
Payer: COMMERCIAL

## 2025-03-17 PROCEDURE — RXMED WILLOW AMBULATORY MEDICATION CHARGE

## 2025-03-18 DIAGNOSIS — E78.5 HYPERLIPIDEMIA, UNSPECIFIED HYPERLIPIDEMIA TYPE: Primary | ICD-10-CM

## 2025-03-18 DIAGNOSIS — I25.10 CORONARY ARTERY DISEASE INVOLVING NATIVE HEART, UNSPECIFIED VESSEL OR LESION TYPE, UNSPECIFIED WHETHER ANGINA PRESENT: ICD-10-CM

## 2025-03-18 RX ORDER — EVOLOCUMAB 140 MG/ML
INJECTION, SOLUTION SUBCUTANEOUS
Qty: 2 ML | Refills: 11 | Status: SHIPPED | OUTPATIENT
Start: 2025-03-18 | End: 2026-03-18

## 2025-04-21 ENCOUNTER — SPECIALTY PHARMACY (OUTPATIENT)
Dept: PHARMACY | Facility: CLINIC | Age: 70
End: 2025-04-21

## 2025-04-21 PROCEDURE — RXMED WILLOW AMBULATORY MEDICATION CHARGE

## 2025-04-23 ENCOUNTER — PHARMACY VISIT (OUTPATIENT)
Dept: PHARMACY | Facility: CLINIC | Age: 70
End: 2025-04-23
Payer: COMMERCIAL

## 2025-05-05 ENCOUNTER — OFFICE VISIT (OUTPATIENT)
Dept: CARDIOLOGY | Facility: CLINIC | Age: 70
End: 2025-05-05
Payer: MEDICARE

## 2025-05-05 VITALS
BODY MASS INDEX: 26.36 KG/M2 | HEART RATE: 65 BPM | HEIGHT: 66 IN | RESPIRATION RATE: 18 BRPM | OXYGEN SATURATION: 96 % | DIASTOLIC BLOOD PRESSURE: 90 MMHG | SYSTOLIC BLOOD PRESSURE: 155 MMHG | WEIGHT: 164 LBS

## 2025-05-05 DIAGNOSIS — I10 BENIGN ESSENTIAL HYPERTENSION: ICD-10-CM

## 2025-05-05 DIAGNOSIS — I25.10 ATHEROSCLEROSIS OF NATIVE CORONARY ARTERY, UNSPECIFIED WHETHER ANGINA PRESENT, UNSPECIFIED WHETHER NATIVE OR TRANSPLANTED HEART: Primary | ICD-10-CM

## 2025-05-05 PROCEDURE — 3075F SYST BP GE 130 - 139MM HG: CPT | Performed by: INTERNAL MEDICINE

## 2025-05-05 PROCEDURE — 1036F TOBACCO NON-USER: CPT | Performed by: INTERNAL MEDICINE

## 2025-05-05 PROCEDURE — 93005 ELECTROCARDIOGRAM TRACING: CPT | Performed by: INTERNAL MEDICINE

## 2025-05-05 PROCEDURE — 3008F BODY MASS INDEX DOCD: CPT | Performed by: INTERNAL MEDICINE

## 2025-05-05 PROCEDURE — 99214 OFFICE O/P EST MOD 30 MIN: CPT | Mod: 25 | Performed by: INTERNAL MEDICINE

## 2025-05-05 PROCEDURE — 3079F DIAST BP 80-89 MM HG: CPT | Performed by: INTERNAL MEDICINE

## 2025-05-05 PROCEDURE — 93010 ELECTROCARDIOGRAM REPORT: CPT | Performed by: INTERNAL MEDICINE

## 2025-05-05 PROCEDURE — 1160F RVW MEDS BY RX/DR IN RCRD: CPT | Performed by: INTERNAL MEDICINE

## 2025-05-05 PROCEDURE — 1126F AMNT PAIN NOTED NONE PRSNT: CPT | Performed by: INTERNAL MEDICINE

## 2025-05-05 PROCEDURE — 99214 OFFICE O/P EST MOD 30 MIN: CPT | Performed by: INTERNAL MEDICINE

## 2025-05-05 PROCEDURE — 1159F MED LIST DOCD IN RCRD: CPT | Performed by: INTERNAL MEDICINE

## 2025-05-05 RX ORDER — LOSARTAN POTASSIUM 50 MG/1
50 TABLET ORAL DAILY
Qty: 90 TABLET | Refills: 3 | Status: SHIPPED | OUTPATIENT
Start: 2025-05-05 | End: 2026-05-05

## 2025-05-05 RX ORDER — LATANOPROST 50 UG/ML
1 SOLUTION/ DROPS OPHTHALMIC NIGHTLY
COMMUNITY
Start: 2025-03-26

## 2025-05-05 ASSESSMENT — PAIN SCALES - GENERAL: PAINLEVEL_OUTOF10: 0-NO PAIN

## 2025-05-05 ASSESSMENT — ENCOUNTER SYMPTOMS
OCCASIONAL FEELINGS OF UNSTEADINESS: 0
DEPRESSION: 0
LOSS OF SENSATION IN FEET: 0

## 2025-05-05 NOTE — PROGRESS NOTES
Subjective:  Patient returns for a follow-up.  I have not seen her for over a year.  She is a pleasant 70-year-old female with hypertension, hyperlipidemia and known coronary disease.  She is status post remote RCA and subsequent LAD stenting.    She has had an uneventful past year without any hospitalizations or new health concerns.  She is staying active without any recurrent angina.  She remains compliant with her medications and is tolerating them well.    Of note, she has been noticing some elevated blood pressure readings now and then.  She remains appropriately concerned about this.    She is caring for a chronically ill  and appears to tolerate this well.    Objective:  General: Alert, usual self.  HEENT: Unchanged.  Lungs: Clear without crackles.  Cardiac: Distant heart tones with faint systolic murmur.  Abdomen: Nontender.  Extremities: No edema.  Skin: No acute rash.  Neuro: Grossly intact.    EKG: Normal sinus rhythm.  Within normal limits.    Lipid panel: Cholesterol-181, HDL-92, LDL-67, TG-137.    Impression/plan:  Isabella is doing reasonably well at this time and remains free of any angina to suggest delayed stent restenosis or progressive coronary disease elsewhere.  Given this, I did not think we needed to embark on a stress test or repeat catheterization at this time.    She remains on appropriate dual antiplatelet therapy to protect against stent thrombosis, and she is not having any bleeding problems.    Her blood pressure certainly remains less than ideally controlled.  I will continue her metoprolol and will begin losartan at 50 mg daily.  I will see her back in follow-up in 1 month to reassess her blood pressure control and sort out if we need to escalate her antihypertensive regimen further.    Her lipid profile looks reasonable on combination therapy with low-dose atorvastatin and Repatha, so we will continue this unchanged.    She knows to call for any intercurrent concerns before her  next visit.    Instructions:    Begin losartan 50 mg daily.    Continue other medications unchanged.    Return to clinic in 1 month for repeat blood pressure assessment.

## 2025-05-07 LAB
ATRIAL RATE: 65 BPM
P AXIS: 46 DEGREES
P OFFSET: 204 MS
P ONSET: 144 MS
PR INTERVAL: 150 MS
Q ONSET: 219 MS
QRS COUNT: 10 BEATS
QRS DURATION: 82 MS
QT INTERVAL: 380 MS
QTC CALCULATION(BAZETT): 395 MS
QTC FREDERICIA: 390 MS
R AXIS: 51 DEGREES
T AXIS: 30 DEGREES
T OFFSET: 409 MS
VENTRICULAR RATE: 65 BPM

## 2025-05-14 ENCOUNTER — SPECIALTY PHARMACY (OUTPATIENT)
Dept: PHARMACY | Facility: CLINIC | Age: 70
End: 2025-05-14

## 2025-05-14 PROCEDURE — RXMED WILLOW AMBULATORY MEDICATION CHARGE

## 2025-05-16 ENCOUNTER — PHARMACY VISIT (OUTPATIENT)
Dept: PHARMACY | Facility: CLINIC | Age: 70
End: 2025-05-16
Payer: COMMERCIAL

## 2025-05-21 ENCOUNTER — SPECIALTY PHARMACY (OUTPATIENT)
Dept: PHARMACY | Facility: CLINIC | Age: 70
End: 2025-05-21

## 2025-06-02 ENCOUNTER — OFFICE VISIT (OUTPATIENT)
Dept: CARDIOLOGY | Facility: CLINIC | Age: 70
End: 2025-06-02
Payer: MEDICARE

## 2025-06-02 VITALS
WEIGHT: 165 LBS | HEART RATE: 66 BPM | DIASTOLIC BLOOD PRESSURE: 80 MMHG | OXYGEN SATURATION: 97 % | BODY MASS INDEX: 26.63 KG/M2 | SYSTOLIC BLOOD PRESSURE: 125 MMHG

## 2025-06-02 DIAGNOSIS — I10 ESSENTIAL (PRIMARY) HYPERTENSION: Primary | ICD-10-CM

## 2025-06-02 PROCEDURE — 3079F DIAST BP 80-89 MM HG: CPT | Performed by: INTERNAL MEDICINE

## 2025-06-02 PROCEDURE — 1036F TOBACCO NON-USER: CPT | Performed by: INTERNAL MEDICINE

## 2025-06-02 PROCEDURE — 99213 OFFICE O/P EST LOW 20 MIN: CPT | Performed by: INTERNAL MEDICINE

## 2025-06-02 PROCEDURE — 1159F MED LIST DOCD IN RCRD: CPT | Performed by: INTERNAL MEDICINE

## 2025-06-02 PROCEDURE — 1126F AMNT PAIN NOTED NONE PRSNT: CPT | Performed by: INTERNAL MEDICINE

## 2025-06-02 PROCEDURE — 3074F SYST BP LT 130 MM HG: CPT | Performed by: INTERNAL MEDICINE

## 2025-06-02 PROCEDURE — 1160F RVW MEDS BY RX/DR IN RCRD: CPT | Performed by: INTERNAL MEDICINE

## 2025-06-02 ASSESSMENT — PATIENT HEALTH QUESTIONNAIRE - PHQ9
SUM OF ALL RESPONSES TO PHQ9 QUESTIONS 1 AND 2: 0
2. FEELING DOWN, DEPRESSED OR HOPELESS: NOT AT ALL
1. LITTLE INTEREST OR PLEASURE IN DOING THINGS: NOT AT ALL

## 2025-06-02 ASSESSMENT — PAIN SCALES - GENERAL: PAINLEVEL_OUTOF10: 0-NO PAIN

## 2025-06-02 NOTE — PROGRESS NOTES
Subjective:  Patient returns for a 1 month follow-up for a repeat blood pressure check.    She has remained clinically stable since her last visit.  Her medications remain unchanged except for the addition of losartan 50 mg daily to try to improve her blood pressure control.  She has been tolerating this well and her blood pressure readings have been better.    She has not had any hospitalizations and denies any other new health concerns.    Objective:  General: Alert and unchanged.  HEENT: No thyromegaly.  Lungs: Clear with good air exchange.  Cardiac: Normal S1 and S2 with soft systolic murmur.    Impression/plan:  Isabella is doing quite nicely at this time.  She remains asymptomatic cardiac wise.  Her blood pressure has improved significantly with the addition of losartan at 50 mg daily.  We will thus continue this unchanged, and she will also continue her metoprolol at 50 mg twice a day.  She did not need any prescriptions renewed.  I will see her back in several months as originally planned.    Patient instructions:    Continue current medications unchanged.    Return to clinic in several months.    Call for any intercurrent problems.

## 2025-06-05 PROCEDURE — RXMED WILLOW AMBULATORY MEDICATION CHARGE

## 2025-06-17 ENCOUNTER — PHARMACY VISIT (OUTPATIENT)
Dept: PHARMACY | Facility: CLINIC | Age: 70
End: 2025-06-17
Payer: COMMERCIAL

## 2025-06-24 ENCOUNTER — APPOINTMENT (OUTPATIENT)
Dept: PRIMARY CARE | Facility: CLINIC | Age: 70
End: 2025-06-24
Payer: MEDICARE

## 2025-06-24 VITALS
OXYGEN SATURATION: 93 % | DIASTOLIC BLOOD PRESSURE: 74 MMHG | SYSTOLIC BLOOD PRESSURE: 109 MMHG | BODY MASS INDEX: 26.63 KG/M2 | HEART RATE: 90 BPM | HEIGHT: 66 IN

## 2025-06-24 DIAGNOSIS — K21.9 GASTROESOPHAGEAL REFLUX DISEASE, UNSPECIFIED WHETHER ESOPHAGITIS PRESENT: ICD-10-CM

## 2025-06-24 DIAGNOSIS — E55.9 VITAMIN D DEFICIENCY: ICD-10-CM

## 2025-06-24 DIAGNOSIS — H40.9 GLAUCOMA, UNSPECIFIED GLAUCOMA TYPE, UNSPECIFIED LATERALITY: ICD-10-CM

## 2025-06-24 DIAGNOSIS — I10 BENIGN ESSENTIAL HYPERTENSION: Primary | ICD-10-CM

## 2025-06-24 DIAGNOSIS — Z00.00 WELLNESS EXAMINATION: ICD-10-CM

## 2025-06-24 DIAGNOSIS — M47.812 CERVICAL SPONDYLOSIS WITHOUT MYELOPATHY: ICD-10-CM

## 2025-06-24 DIAGNOSIS — Z00.00 MEDICARE ANNUAL WELLNESS VISIT, SUBSEQUENT: ICD-10-CM

## 2025-06-24 DIAGNOSIS — G47.00 INSOMNIA, UNSPECIFIED TYPE: ICD-10-CM

## 2025-06-24 DIAGNOSIS — I25.10 CORONARY ARTERY DISEASE INVOLVING NATIVE HEART, UNSPECIFIED VESSEL OR LESION TYPE, UNSPECIFIED WHETHER ANGINA PRESENT: ICD-10-CM

## 2025-06-24 DIAGNOSIS — Z12.31 ENCOUNTER FOR SCREENING MAMMOGRAM FOR MALIGNANT NEOPLASM OF BREAST: ICD-10-CM

## 2025-06-24 PROCEDURE — 99397 PER PM REEVAL EST PAT 65+ YR: CPT | Performed by: STUDENT IN AN ORGANIZED HEALTH CARE EDUCATION/TRAINING PROGRAM

## 2025-06-24 PROCEDURE — 3074F SYST BP LT 130 MM HG: CPT | Performed by: STUDENT IN AN ORGANIZED HEALTH CARE EDUCATION/TRAINING PROGRAM

## 2025-06-24 PROCEDURE — 99214 OFFICE O/P EST MOD 30 MIN: CPT | Performed by: STUDENT IN AN ORGANIZED HEALTH CARE EDUCATION/TRAINING PROGRAM

## 2025-06-24 PROCEDURE — G0439 PPPS, SUBSEQ VISIT: HCPCS | Performed by: STUDENT IN AN ORGANIZED HEALTH CARE EDUCATION/TRAINING PROGRAM

## 2025-06-24 PROCEDURE — 1159F MED LIST DOCD IN RCRD: CPT | Performed by: STUDENT IN AN ORGANIZED HEALTH CARE EDUCATION/TRAINING PROGRAM

## 2025-06-24 PROCEDURE — 3078F DIAST BP <80 MM HG: CPT | Performed by: STUDENT IN AN ORGANIZED HEALTH CARE EDUCATION/TRAINING PROGRAM

## 2025-06-24 PROCEDURE — G2211 COMPLEX E/M VISIT ADD ON: HCPCS | Performed by: STUDENT IN AN ORGANIZED HEALTH CARE EDUCATION/TRAINING PROGRAM

## 2025-06-24 PROCEDURE — 1036F TOBACCO NON-USER: CPT | Performed by: STUDENT IN AN ORGANIZED HEALTH CARE EDUCATION/TRAINING PROGRAM

## 2025-06-24 PROCEDURE — 1160F RVW MEDS BY RX/DR IN RCRD: CPT | Performed by: STUDENT IN AN ORGANIZED HEALTH CARE EDUCATION/TRAINING PROGRAM

## 2025-06-24 RX ORDER — ERGOCALCIFEROL 1.25 MG/1
1.25 CAPSULE ORAL
Qty: 3 CAPSULE | Refills: 3 | Status: SHIPPED | OUTPATIENT
Start: 2025-06-24 | End: 2026-06-24

## 2025-06-24 RX ORDER — TRAZODONE HYDROCHLORIDE 50 MG/1
50 TABLET ORAL NIGHTLY PRN
Qty: 30 TABLET | Refills: 1 | Status: SHIPPED | OUTPATIENT
Start: 2025-06-24 | End: 2026-06-24

## 2025-06-24 ASSESSMENT — COLUMBIA-SUICIDE SEVERITY RATING SCALE - C-SSRS
1. IN THE PAST MONTH, HAVE YOU WISHED YOU WERE DEAD OR WISHED YOU COULD GO TO SLEEP AND NOT WAKE UP?: NO
6. HAVE YOU EVER DONE ANYTHING, STARTED TO DO ANYTHING, OR PREPARED TO DO ANYTHING TO END YOUR LIFE?: NO
2. HAVE YOU ACTUALLY HAD ANY THOUGHTS OF KILLING YOURSELF?: NO

## 2025-06-24 ASSESSMENT — PATIENT HEALTH QUESTIONNAIRE - PHQ9
2. FEELING DOWN, DEPRESSED OR HOPELESS: NOT AT ALL
1. LITTLE INTEREST OR PLEASURE IN DOING THINGS: NOT AT ALL
SUM OF ALL RESPONSES TO PHQ9 QUESTIONS 1 AND 2: 0

## 2025-06-24 ASSESSMENT — ENCOUNTER SYMPTOMS
OCCASIONAL FEELINGS OF UNSTEADINESS: 0
DEPRESSION: 0
LOSS OF SENSATION IN FEET: 0

## 2025-06-24 NOTE — PROGRESS NOTES
70-year-old female presenting for follow-up on multiple concerns, Medicare annual wellness exam/CPE.  Mostly stable from last visit.    HTN, CAD  Stable, tolerating new regimen well.  Recently started losartan.  Also follows with cardiology.    Left shoulder pain, neck pain  Stable on current regimen.    GERD  Stable, tolerating current regimen well.    Glaucoma  Follow-up with ophthalmology    Insomnia  Occasionally cannot sleep due to racing thoughts.  Would like to try sleep medication.    SocHx:   - Smoking: Remote history over 40 years ago    12 point ROS reviewed and negative other than as stated in HPI    General: Alert, oriented, pleasant, in no acute distress  HEENT:      Head: normocephalic, atraumatic;      eyes: EOMI, no scleral icterus;   Neck: soft, supple, non-tender, no masses appreciated  CV: Heart with regular rate and rhythm, normal S1/S2, no murmurs  Lungs: CTAB without wheezing, rhonchi or rales; good respiratory effort, no increased work of breathing  Abdomen: soft, non-tender, non-distended, no masses appreciated  Extremities: no edema, no cyanosis  Neuro: Cranial nerves grossly intact; alert and oriented  Psych: Appropriate mood and affect    # HM  -CBC, CMP, Lipid panel, Vit D, TSH with reflex T4  -Vaccines:       Flu: Out of season      Shingrix: UTD      Pneumococcal: UTD      Tdap: UTD   -Fortino w/ sherri: Ordered  -Lung cancer screening with low-dose CT: Remote history over 40 years ago, does not qualify for screening  -Colonoscopy: , 5-year plan  -Osteoporosis screenin2024, osteopenia    #HTN  -BP at goal in office  -Continue metoprolol tartrate 50 mg twice daily, losartan 50 mg qd  -Repeat CMP    #CAD  - Continue clopidogrel 75 mg daily, aspirin 81 mg daily atorvastatin 20 mg daily, Repatha 140 mg every 2 weeks  -Continue to follow cardiology  -Repeat lipid panel    #Left shoulder pain #Neck pain  -Continue cyclobenzaprine 5 mg as needed    #GERD  -Continue  famotidine    #Glaucoma  - Continue to follow with ophthalmology    #Insomnia  -Trial of trazodone 50 mg at bedtime as needed    #Vitamin D deficiency  - Currently on 50,000 units once monthly  - Repeat lab    F/U 4-6 months, sooner if needed    Chris D'Amico, DO

## 2025-06-29 LAB
25(OH)D3+25(OH)D2 SERPL-MCNC: 42 NG/ML (ref 30–100)
ALBUMIN SERPL-MCNC: 4.3 G/DL (ref 3.6–5.1)
ALP SERPL-CCNC: 60 U/L (ref 37–153)
ALT SERPL-CCNC: 23 U/L (ref 6–29)
ANION GAP SERPL CALCULATED.4IONS-SCNC: 7 MMOL/L (CALC) (ref 7–17)
AST SERPL-CCNC: 22 U/L (ref 10–35)
BILIRUB SERPL-MCNC: 0.7 MG/DL (ref 0.2–1.2)
BUN SERPL-MCNC: 17 MG/DL (ref 7–25)
CALCIUM SERPL-MCNC: 8.9 MG/DL (ref 8.6–10.4)
CHLORIDE SERPL-SCNC: 108 MMOL/L (ref 98–110)
CHOLEST SERPL-MCNC: 146 MG/DL
CHOLEST/HDLC SERPL: 1.9 (CALC)
CO2 SERPL-SCNC: 26 MMOL/L (ref 20–32)
CREAT SERPL-MCNC: 0.88 MG/DL (ref 0.5–1.05)
EGFRCR SERPLBLD CKD-EPI 2021: 71 ML/MIN/1.73M2
ERYTHROCYTE [DISTWIDTH] IN BLOOD BY AUTOMATED COUNT: 12.2 % (ref 11–15)
GLUCOSE SERPL-MCNC: 94 MG/DL (ref 65–99)
HCT VFR BLD AUTO: 41.9 % (ref 35–45)
HDLC SERPL-MCNC: 78 MG/DL
HGB BLD-MCNC: 13.7 G/DL (ref 11.7–15.5)
LDLC SERPL CALC-MCNC: 50 MG/DL (CALC)
MCH RBC QN AUTO: 32.1 PG (ref 27–33)
MCHC RBC AUTO-ENTMCNC: 32.7 G/DL (ref 32–36)
MCV RBC AUTO: 98.1 FL (ref 80–100)
NONHDLC SERPL-MCNC: 68 MG/DL (CALC)
PLATELET # BLD AUTO: 238 THOUSAND/UL (ref 140–400)
PMV BLD REES-ECKER: 9.3 FL (ref 7.5–12.5)
POTASSIUM SERPL-SCNC: 4.7 MMOL/L (ref 3.5–5.3)
PROT SERPL-MCNC: 6.8 G/DL (ref 6.1–8.1)
RBC # BLD AUTO: 4.27 MILLION/UL (ref 3.8–5.1)
SODIUM SERPL-SCNC: 141 MMOL/L (ref 135–146)
TRIGL SERPL-MCNC: 99 MG/DL
TSH SERPL-ACNC: 2.07 MIU/L (ref 0.4–4.5)
WBC # BLD AUTO: 4.4 THOUSAND/UL (ref 3.8–10.8)

## 2025-07-10 ENCOUNTER — SPECIALTY PHARMACY (OUTPATIENT)
Dept: PHARMACY | Facility: CLINIC | Age: 70
End: 2025-07-10

## 2025-07-10 PROCEDURE — RXMED WILLOW AMBULATORY MEDICATION CHARGE

## 2025-07-14 ENCOUNTER — PHARMACY VISIT (OUTPATIENT)
Dept: PHARMACY | Facility: CLINIC | Age: 70
End: 2025-07-14
Payer: COMMERCIAL

## 2025-07-18 ENCOUNTER — HOSPITAL ENCOUNTER (OUTPATIENT)
Dept: RADIOLOGY | Facility: CLINIC | Age: 70
Discharge: HOME | End: 2025-07-18
Payer: MEDICARE

## 2025-07-18 VITALS — HEIGHT: 66 IN | WEIGHT: 164.9 LBS | BODY MASS INDEX: 26.5 KG/M2

## 2025-07-18 DIAGNOSIS — Z12.31 ENCOUNTER FOR SCREENING MAMMOGRAM FOR MALIGNANT NEOPLASM OF BREAST: ICD-10-CM

## 2025-07-18 PROCEDURE — 77067 SCR MAMMO BI INCL CAD: CPT

## 2025-07-31 ENCOUNTER — SPECIALTY PHARMACY (OUTPATIENT)
Dept: PHARMACY | Facility: CLINIC | Age: 70
End: 2025-07-31

## 2025-08-05 PROCEDURE — RXMED WILLOW AMBULATORY MEDICATION CHARGE

## 2025-08-07 ENCOUNTER — PHARMACY VISIT (OUTPATIENT)
Dept: PHARMACY | Facility: CLINIC | Age: 70
End: 2025-08-07
Payer: COMMERCIAL

## 2025-08-29 ENCOUNTER — SPECIALTY PHARMACY (OUTPATIENT)
Dept: PHARMACY | Facility: CLINIC | Age: 70
End: 2025-08-29

## 2025-08-29 PROCEDURE — RXMED WILLOW AMBULATORY MEDICATION CHARGE

## 2025-09-03 ENCOUNTER — PHARMACY VISIT (OUTPATIENT)
Dept: PHARMACY | Facility: CLINIC | Age: 70
End: 2025-09-03
Payer: COMMERCIAL

## 2025-09-03 ENCOUNTER — TELEPHONE (OUTPATIENT)
Dept: CARDIOLOGY | Facility: CLINIC | Age: 70
End: 2025-09-03
Payer: MEDICARE

## 2025-09-03 DIAGNOSIS — I25.10 CAD (CORONARY ARTERY DISEASE): ICD-10-CM

## 2025-09-03 RX ORDER — ATORVASTATIN CALCIUM 20 MG/1
20 TABLET, FILM COATED ORAL
Qty: 90 TABLET | Refills: 3 | Status: SHIPPED | OUTPATIENT
Start: 2025-09-03 | End: 2026-09-03

## 2025-12-04 ENCOUNTER — APPOINTMENT (OUTPATIENT)
Dept: PRIMARY CARE | Facility: CLINIC | Age: 70
End: 2025-12-04
Payer: MEDICARE